# Patient Record
Sex: FEMALE | Employment: FULL TIME | URBAN - METROPOLITAN AREA
[De-identification: names, ages, dates, MRNs, and addresses within clinical notes are randomized per-mention and may not be internally consistent; named-entity substitution may affect disease eponyms.]

---

## 2017-01-18 ENCOUNTER — OFFICE VISIT (OUTPATIENT)
Dept: INTERNAL MEDICINE CLINIC | Age: 42
End: 2017-01-18

## 2017-01-18 VITALS
HEIGHT: 66 IN | BODY MASS INDEX: 25.55 KG/M2 | OXYGEN SATURATION: 96 % | TEMPERATURE: 97.6 F | HEART RATE: 70 BPM | WEIGHT: 159 LBS | DIASTOLIC BLOOD PRESSURE: 76 MMHG | SYSTOLIC BLOOD PRESSURE: 100 MMHG

## 2017-01-18 DIAGNOSIS — L98.9 LEG LESION: Primary | ICD-10-CM

## 2017-01-18 NOTE — MR AVS SNAPSHOT
Visit Information Date & Time Provider Department Dept. Phone Encounter #  
 1/18/2017 11:00 AM Rhys Boschma Internist of [facility] [id number] Your Appointments 8/29/2017  9:30 AM  
PHYSICAL with Deb Ojeda MD  
Internist of [address]) Appt Note: rpe sarris [address], [facility] [address] [place] [address] [place]  
  
   
 [address], [facility] hospital Upcoming Health Maintenance Date Due  
 PAP AKA CERVICAL CYTOLOGY 3/10/1997 INFLUENZA AGE 9 TO ADULT 8/1/2016 DTaP/Tdap/Td series (2 - Td) 6/1/2020 Allergies as of 1/18/2017  Review Complete On: 1/18/2017 By: Hair Gaitan LPN Severity Noted Reaction Type Reactions Other Medication  09/27/2011    Hives Strawberries Current Immunizations  Reviewed on 11/12/2015 Name Date Influenza Vaccine 12/4/2012 Influenza Vaccine (Quad) PF 11/12/2015 Influenza Vaccine PF 10/31/2014  4:18 PM, 10/14/2013 Influenza Vaccine Split 11/17/2011  3:27 PM  
 Influenza Vaccine Whole 6/30/2010 Tdap 6/1/2010 Not reviewed this visit Vitals BP Pulse Temp Height(growth percentile) Weight(growth percentile) LMP  
 100/76 70 97.6 °F (36.4 °C) (Oral) 5' 6\" (1.676 m) 159 lb (72.1 kg) 12/24/2016 SpO2 BMI OB Status Smoking Status 96% 25.66 kg/m2 Having regular periods Never Smoker Vitals History BMI and BSA Data Body Mass Index Body Surface Area  
 25.66 kg/m 2 1.83 m 2 Preferred Pharmacy Pharmacy Name Phone [phone] - [place], [address] AT [phone] Your Updated Medication List  
  
   
This list is accurate as of: 1/18/17 11:20 AM.  Always use your most recent med list.  
  
  
  
  
 levothyroxine 50 mcg tablet Commonly known as:  SYNTHROID Take 88 mcg by mouth Daily (before breakfast). Introducing [facility]! Houston Rosales introduces [employer] patient portal. Now you can access parts of your medical record, email your doctor's office, and request medication refills online. 1. In your internet browser, go to [email or web address] 2. Click on the First Time User? Click Here link in the Sign In box. You will see the New Member Sign Up page. 3. Enter your [employer] Access Code exactly as it appears below. You will not need to use this code after youve completed the sign-up process. If you do not sign up before the expiration date, you must request a new code. · [employer] Access Code: [email or web address] Expires: 4/18/2017 10:52 AM 
 
4. Enter the last four digits of your Social Security Number (xxxx) and Date of Birth (mm/dd/yyyy) as indicated and click Submit. You will be taken to the next sign-up page. 5. Create a [employer] ID. This will be your [employer] login ID and cannot be changed, so think of one that is secure and easy to remember. 6. Create a [employer] password. You can change your password at any time. 7. Enter your Password Reset Question and Answer. This can be used at a later time if you forget your password. 8. Enter your e-mail address. You will receive e-mail notification when new information is available in [address] 9. Click Sign Up. You can now view and download portions of your medical record. 10. Click the Download Summary menu link to download a portable copy of your medical information. If you have questions, please visit the Frequently Asked Questions section of the [employer] website. Remember, [employer] is NOT to be used for urgent needs. For medical emergencies, dial 911. Now available from your iPhone and Android! Please provide this summary of care documentation to your next provider. Your primary care clinician is listed as Vince Goldberg. If you have any questions after today's visit, please call [phone].

## 2017-01-18 NOTE — PROGRESS NOTES
HPI/History  Nessa Aguilar is a 37 y.o.  female who presents for evaluation. Pt reports noticing a small bump in left medial thigh about 2 months ago. Lesion has been asymptomatic and without any changes or developments. Although the lesion and area has not been painful or tender, she admits to some thigh soreness recently from squat exercises. There has been no other developments, sxs, or complaints. Pt reports her mother recently told her she and her sister have had lipomas at some point. Patient Active Problem List   Diagnosis Code    Family history of breast cancer Z80.3    Family history of thyroid disease Z83.49    Subclinical hypothyroidism E03.9     Past Medical History   Diagnosis Date    Family history of breast cancer     Family history of thyroid disease     Subclinical hypothyroidism      Past Surgical History   Procedure Laterality Date    Hx gyn       c section     Social History     Social History    Marital status:      Spouse name: N/A    Number of children: N/A    Years of education: N/A     Occupational History    Not on file. Social History Main Topics    Smoking status: Never Smoker    Smokeless tobacco: Never Used    Alcohol use Yes      Comment: infrequently    Drug use: Not on file    Sexual activity: Not on file     Other Topics Concern    Not on file     Social History Narrative     Family History   Problem Relation Age of Onset   [address] [facility] Breast Cancer Mother 48    Hypertension Father     Other Sister      identical twin; Grave's disease     Current Outpatient Prescriptions   Medication Sig    levothyroxine (SYNTHROID) 50 mcg tablet Take 88 mcg by mouth Daily (before breakfast). No current facility-administered medications for this visit. Allergies   Allergen Reactions    Other Medication Hives     Strawberries         Review of Systems  Significant findings included in HPI.     Physical Examination  Visit Vitals    /76    Pulse 70  Temp 97.6 °F (36.4 °C) (Oral)    Ht 5' 6\" (1.676 m)    Wt 159 lb (72.1 kg)    LMP 12/24/2016    SpO2 96%    BMI 25.66 kg/m2       General - Alert and in no acute distress. Pt appears well, comfortable, and in good spirits. Nontoxic. Not anxious, non-diaphoretic. Mental status - Appropriate mood, behavior, speech content, dress, and thought processes. Pulm - No tachypnea, retractions, or cyanosis. Good respiratory effort. Cardiovascular - Normal rate. LE - There is a very small, nonvisible but palpable lesion of the left anteromedial thigh several inches above the knee. No apparent skin lesion or signs of past trauma. Lesion more of a minimal subcutaneous area of induration than anything. Could just be induration (past trauma, other), small cyst, or miniscule lipoma. There are no concerning sxs or other findings. Assessment and Plan  1. Left thigh lesion - Asymptomatic and without changes, developments, or concerning sxs. Unknown etiology but likely benign and discussed various potentials. She will observe and return if becoming symptomatic or any changes/developments. Pt happily agrees with plan. PLEASE NOTE:   This document has been produced using voice recognition software. Unrecognized errors in transcription may be present.     Garret  [employer] [facility]  [phone]  1/18/2017

## 2017-02-06 RX ORDER — OSELTAMIVIR PHOSPHATE 75 MG/1
75 CAPSULE ORAL DAILY
Qty: 10 CAP | Refills: 0 | Status: SHIPPED | OUTPATIENT
Start: 2017-02-06 | End: 2017-02-16

## 2017-02-06 NOTE — TELEPHONE ENCOUNTER
Pt called and stated tht her son was dx with the flu last night and she is asking for a rx for tamiflu, pls send to [facility] on [address] [place]

## 2017-02-15 ENCOUNTER — TELEPHONE (OUTPATIENT)
Dept: INTERNAL MEDICINE CLINIC | Age: 42
End: 2017-02-15

## 2017-03-08 ENCOUNTER — TELEPHONE (OUTPATIENT)
Dept: INTERNAL MEDICINE CLINIC | Age: 42
End: 2017-03-08

## 2017-03-08 NOTE — TELEPHONE ENCOUNTER
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

## 2017-04-13 ENCOUNTER — HOSPITAL ENCOUNTER (OUTPATIENT)
Dept: MAMMOGRAPHY | Age: 42
Discharge: HOME | End: 2017-04-13
Attending: INTERNAL MEDICINE
Payer: COMMERCIAL

## 2017-04-13 ENCOUNTER — DOCUMENTATION ONLY (OUTPATIENT)
Dept: SURGERY | Age: 42
End: 2017-04-13

## 2017-04-13 DIAGNOSIS — Z12.31 VISIT FOR SCREENING MAMMOGRAM: ICD-10-CM

## 2017-04-13 PROCEDURE — 77067 SCR MAMMO BI INCL CAD: CPT

## 2017-08-23 ENCOUNTER — LAB ONLY (OUTPATIENT)
Dept: INTERNAL MEDICINE CLINIC | Age: 42
End: 2017-08-23

## 2017-08-23 DIAGNOSIS — Z83.49 FAMILY HISTORY OF THYROID DISEASE: ICD-10-CM

## 2017-08-23 DIAGNOSIS — Z00.00 ROUTINE GENERAL MEDICAL EXAMINATION AT A HEALTH CARE FACILITY: Primary | ICD-10-CM

## 2017-08-23 DIAGNOSIS — E03.8 SUBCLINICAL HYPOTHYROIDISM: ICD-10-CM

## 2017-08-29 ENCOUNTER — OFFICE VISIT (OUTPATIENT)
Dept: INTERNAL MEDICINE CLINIC | Age: 42
End: 2017-08-29

## 2017-08-29 ENCOUNTER — TELEPHONE (OUTPATIENT)
Dept: INTERNAL MEDICINE CLINIC | Age: 42
End: 2017-08-29

## 2017-08-29 VITALS
TEMPERATURE: 98.4 F | OXYGEN SATURATION: 100 % | BODY MASS INDEX: 25.07 KG/M2 | RESPIRATION RATE: 12 BRPM | HEART RATE: 70 BPM | WEIGHT: 156 LBS | DIASTOLIC BLOOD PRESSURE: 60 MMHG | SYSTOLIC BLOOD PRESSURE: 116 MMHG | HEIGHT: 66 IN

## 2017-08-29 DIAGNOSIS — Z00.01 ENCOUNTER FOR ROUTINE ADULT PHYSICAL EXAM WITH ABNORMAL FINDINGS: Primary | ICD-10-CM

## 2017-08-29 DIAGNOSIS — Z80.3 FAMILY HISTORY OF BREAST CANCER: ICD-10-CM

## 2017-08-29 DIAGNOSIS — E28.39 PREMATURE OVARIAN FAILURE: ICD-10-CM

## 2017-08-29 DIAGNOSIS — E27.1 ADRENAL INSUFFICIENCY (ADDISON'S DISEASE) (HCC): ICD-10-CM

## 2017-08-29 DIAGNOSIS — Z23 ENCOUNTER FOR IMMUNIZATION: ICD-10-CM

## 2017-08-29 DIAGNOSIS — E31.0: ICD-10-CM

## 2017-08-29 DIAGNOSIS — E03.9 PRIMARY HYPOTHYROIDISM: ICD-10-CM

## 2017-08-29 RX ORDER — NORETHINDRONE ACETATE AND ETHINYL ESTRADIOL .5; 2.5 MG/1; UG/1
1 TABLET ORAL DAILY
COMMUNITY

## 2017-08-29 RX ORDER — ACAR/CYST/ALA/Q10/P.SER/BROCC 800-300 MG
POWDER IN PACKET (EA) ORAL
COMMUNITY

## 2017-08-29 NOTE — PATIENT INSTRUCTIONS
Advance Directives: Care Instructions  Your Care Instructions  An advance directive is a legal way to state your wishes at the end of your life. It tells your family and your doctor what to do if you can no longer say what you want. There are two main types of advance directives. You can change them any time that your wishes change. · A living will tells your family and your doctor your wishes about life support and other treatment. · A durable power of  for health care lets you name a person to make treatment decisions for you when you can't speak for yourself. This person is called a health care agent. If you do not have an advance directive, decisions about your medical care may be made by a doctor or a  who doesn't know you. It may help to think of an advance directive as a gift to the people who care for you. If you have one, they won't have to make tough decisions by themselves. Follow-up care is a key part of your treatment and safety. Be sure to make and go to all appointments, and call your doctor if you are having problems. It's also a good idea to know your test results and keep a list of the medicines you take. How can you care for yourself at home? · Discuss your wishes with your loved ones and your doctor. This way, there are no surprises. · Many states have a unique form. Or you might use a universal form that has been approved by many states. This kind of form can sometimes be completed and stored online. Your electronic copy will then be available wherever you have a connection to the Internet. In most cases, doctors will respect your wishes even if you have a form from a different state. · You don't need a  to do an advance directive. But you may want to get legal advice. · Think about these questions when you prepare an advance directive:  ¨ Who do you want to make decisions about your medical care if you are not able to?  Many people choose a family member or close friend. ¨ Do you know enough about life support methods that might be used? If not, talk to your doctor so you understand. ¨ What are you most afraid of that might happen? You might be afraid of having pain, losing your independence, or being kept alive by machines. ¨ Where would you prefer to die? Choices include your home, a hospital, or a nursing home. ¨ Would you like to have information about hospice care to support you and your family? ¨ Do you want to donate organs when you die? ¨ Do you want certain Spiritism practices performed before you die? If so, put your wishes in the advance directive. · Read your advance directive every year, and make changes as needed. When should you call for help? Be sure to contact your doctor if you have any questions. Where can you learn more? Go to http://elizabethComat Technologieseben.info/. Enter R264 in the search box to learn more about \"Advance Directives: Care Instructions. \"  Current as of: November 17, 2016  Content Version: 11.3  © 2977-8196 [employer]. Care instructions adapted under license by [employer] (which disclaims liability or warranty for this information). If you have questions about a medical condition or this instruction, always ask your healthcare professional. [address] any warranty or liability for your use of this information. Learning About Healthy Weight  What is a healthy weight? A healthy weight is the weight at which you feel good about yourself and have energy for work and play. It's also one that lowers your risk for health problems. What can you do to stay at a healthy weight? It can be hard to stay at a healthy weight, especially when fast food, vending-machine snacks, and processed foods are so easy to find. And with your busy lifestyle, activity may be low on your list of things to do. But staying at a healthy weight may be easier than you think.   Here are some dos and don'ts for staying at a healthy weight:  Do eat healthy foods  The kinds of foods you eat have a big impact on both your weight and your health. Reaching and staying at a healthy weight is not about going on a diet. It's about making healthier food choices every day and changing your diet for good. Healthy eating means eating a variety of foods so that you get all the nutrients you need. Your body needs protein, carbohydrate, and fats for energy. They keep your heart beating, your brain active, and your muscles working. On most days, try to eat from each food group. This means eating a variety of:  · Whole grains, such as whole wheat breads and pastas. · Fruits and vegetables. · Dairy products, such as low-fat milk, yogurt, and cheese. · Lean proteins, such as all types of fish, chicken without the skin, and beans. Don't have too much or too little of one thing. All foods, if eaten in moderation, can be part of healthy eating. Even sweets can be okay. If your favorite foods are high in fat, salt, sugar, or calories, limit how often you eat them. Eat smaller servings, or look for healthy substitutes. Do watch what you eat  Many people eat more than their bodies need. Part of staying at a healthy weight means learning how much food you really need from day to day and not eating more than that. Even with healthy foods, eating too much can make you gain weight. Having a well-balanced diet means that you eat enough, but not too much, and that your food gives you the nutrients you need to stay healthy. So listen to your body. Eat when you're hungry. Stop when you feel satisfied. It's a good idea to have healthy snacks ready for when you get hungry. Keep healthy snacks with you at work, in your car, and at home. If you have a healthy snack easily available, you'll be less likely to pick a candy bar or bag of chips from a vending machine instead.   Some healthy snacks you might want to keep on hand are fruit, low-fat yogurt, string cheese, low-fat microwave popcorn, raisins and other dried fruit, nuts, whole wheat crackers, pretzels, carrots, celery sticks, and broccoli. Do some physical activity  A big part of reaching and staying at a healthy weight is being active. When you're active, you burn calories. This makes it easier to reach and stay at a healthy weight. When you're active on a regular basis, your body burns more calories, even when you're at rest. Being active helps you lose fat and build lean muscle. Try to be active for at least 1 hour every day. This may sound like a lot, but it's okay to be active in smaller blocks of time that add up to 1 hour a day. Any activity that makes your heart beat faster and keeps it there for a while counts. A brisk walk, run, or swim will get your heart beating faster. So will climbing stairs, shooting baskets, or cycling. Even some household chores like vacuuming and mowing the lawn will get your heart rate up. Pick activities that you enjoyones that make your heart beat faster, your muscles stronger, and your muscles and joints more flexible. If you find more than one thing you like doing, do them all. You don't have to do the same thing every day. Don't diet  Diets don't work. Diets are temporary. Because you give up so much when you diet, you may be hungry and think about food all the time. And after you stop dieting, you also may overeat to make up for what you missed. Most people who diet end up gaining back the pounds they lostand more. Remember that healthy bodies come in lots of shapes and sizes. Everyone can get healthier by eating better and being more active. Where can you learn more? Go to http://elizabeth-eben.info/. Enter 293 7930 in the search box to learn more about \"Learning About Healthy Weight. \"  Current as of: October 13, 2016  Content Version: 11.3  © 7048-6095 [employer].  Care instructions adapted under license by [address] (which disclaims liability or warranty for this information). If you have questions about a medical condition or this instruction, always ask your healthcare professional. [address] any warranty or liability for your use of this information.

## 2017-08-29 NOTE — TELEPHONE ENCOUNTER
Please request results of well woman exam and pap smear from Dr. Destiny Hudson, done on 5/12/2017. Thank you.

## 2017-08-29 NOTE — MR AVS SNAPSHOT
Visit Information Date & Time Provider Department Dept. Phone Encounter #  
 8/29/2017  9:30 AM Chiquis Zazueta MD Internist of [facility] [phone] [id number] Follow-up Instructions Return in about 1 year (around 8/29/2018), or if symptoms worsen or fail to improve. Your Appointments 8/23/2018  8:25 AM  
LAB with [facility] NURSE VISIT Internist of [facility] ([facility]) Appt Note: lab  
 [address], [address] [address] [address] [place]  
  
   
 [address], [address]  
  
    
 8/30/2018  8:00 AM  
PHYSICAL with Chiquis Zazueta MD  
Internist of [facility] Appt Note: rpe bs  
 [address] [facility], [facility] [address] [address] [place]  
  
   
 [address], [address] Upcoming Health Maintenance Date Due INFLUENZA AGE 9 TO ADULT 8/1/2017 PAP AKA CERVICAL CYTOLOGY 5/12/2020 DTaP/Tdap/Td series (2 - Td) 6/1/2020 Allergies as of 8/29/2017  Review Complete On: 8/29/2017 By: Elsa Terry Severity Noted Reaction Type Reactions Other Medication  09/27/2011    Hives Strawberries Current Immunizations  Reviewed on 11/12/2015 Name Date Influenza Vaccine 12/4/2012 Influenza Vaccine (Quad) PF  Incomplete, 11/12/2015 Influenza Vaccine PF 10/31/2014  4:18 PM, 10/14/2013 Influenza Vaccine Split 11/17/2011  3:27 PM  
 Influenza Vaccine Whole 6/30/2010 Tdap 6/1/2010 Not reviewed this visit You Were Diagnosed With   
  
 Codes Comments Encounter for routine adult physical exam with abnormal findings    -  Primary ICD-10-CM: Z00.01 
ICD-9-CM: V70.0 Encounter for immunization     ICD-10-CM: V08 ICD-9-CM: V03.89 Vitals BP Pulse Temp Resp Height(growth percentile) Weight(growth percentile)  116/60 (BP 1 Location: Right arm, BP Patient Position: Sitting) 70 98.4 °F (36.9 °C) (Oral) 12 5' 6\" (1.676 m) 156 lb (70.8 kg) LMP SpO2 BMI OB Status Smoking Status 03/15/2017 (Approximate) 100% 25.18 kg/m2 Unknown Never Smoker Vitals History BMI and BSA Data Body Mass Index Body Surface Area  
 25.18 kg/m 2 1.82 m 2 Preferred Pharmacy Pharmacy Name Phone [phone] [facility] [place] [address] AT [address] [phone] Your Updated Medication List  
  
   
This list is accurate as of: 8/29/17 10:22 AM.  Always use your most recent med list.  
  
  
  
  
 DHEA 25 mg Tab Generic drug:  prasterone (dhea) Take  by mouth.  
  
 levothyroxine 50 mcg tablet Commonly known as:  SYNTHROID Take 88 mcg by mouth Daily (before breakfast). norethindrone-ethinyl estradiol 0.5-2.5 mg-mcg per tablet Commonly known as:  FEMHRT LOW DOSE Take 1 Tab by mouth daily. We Performed the Following INFLUENZA VIRUS VAC QUAD,SPLIT,PRESV FREE SYRINGE 3/> YRS IM [id number] CPT(R)] Follow-up Instructions Return in about 1 year (around 8/29/2018), or if symptoms worsen or fail to improve. Patient Instructions Advance Directives: Care Instructions Your Care Instructions An advance directive is a legal way to state your wishes at the end of your life. It tells your family and your doctor what to do if you can no longer say what you want. There are two main types of advance directives. You can change them any time that your wishes change. · A living will tells your family and your doctor your wishes about life support and other treatment. · A durable power of  for health care lets you name a person to make treatment decisions for you when you can't speak for yourself. This person is called a health care agent. If you do not have an advance directive, decisions about your medical care may be made by a doctor or a  who doesn't know you. It may help to think of an advance directive as a gift to the people who care for you. If you have one, they won't have to make tough decisions by themselves. Follow-up care is a key part of your treatment and safety. Be sure to make and go to all appointments, and call your doctor if you are having problems. It's also a good idea to know your test results and keep a list of the medicines you take. How can you care for yourself at home? · Discuss your wishes with your loved ones and your doctor. This way, there are no surprises. · Many states have a unique form. Or you might use a universal form that has been approved by many states. This kind of form can sometimes be completed and stored online. Your electronic copy will then be available wherever you have a connection to the Internet. In most cases, doctors will respect your wishes even if you have a form from a different state. · You don't need a  to do an advance directive. But you may want to get legal advice. · Think about these questions when you prepare an advance directive: ¨ Who do you want to make decisions about your medical care if you are not able to? Many people choose a family member or close friend. ¨ Do you know enough about life support methods that might be used? If not, talk to your doctor so you understand. ¨ What are you most afraid of that might happen? You might be afraid of having pain, losing your independence, or being kept alive by machines. ¨ Where would you prefer to die? Choices include your home, a hospital, or a nursing home. ¨ Would you like to have information about hospice care to support you and your family? ¨ Do you want to donate organs when you die? ¨ Do you want certain Confucianism practices performed before you die? If so, put your wishes in the advance directive. · Read your advance directive every year, and make changes as needed. When should you call for help? Be sure to contact your doctor if you have any questions. Where can you learn more? Go to http://elizabeth-been.info/. Enter R264 in the search box to learn more about \"Advance Directives: Care Instructions. \" Current as of: November 17, 2016 Content Version: 11.3 © 3673-8044 "[employer]. Care instructions adapted under license by [employer] (which disclaims liability or warranty for this information). If you have questions about a medical condition or this instruction, always ask your healthcare professional. [address] any warranty or liability for your use of this information. Learning About Healthy Weight What is a healthy weight? A healthy weight is the weight at which you feel good about yourself and have energy for work and play. It's also one that lowers your risk for health problems. What can you do to stay at a healthy weight? It can be hard to stay at a healthy weight, especially when fast food, vending-machine snacks, and processed foods are so easy to find. And with your busy lifestyle, activity may be low on your list of things to do. But staying at a healthy weight may be easier than you think. Here are some dos and don'ts for staying at a healthy weight: 
Do eat healthy foods The kinds of foods you eat have a big impact on both your weight and your health. Reaching and staying at a healthy weight is not about going on a diet. It's about making healthier food choices every day and changing your diet for good. Healthy eating means eating a variety of foods so that you get all the nutrients you need. Your body needs protein, carbohydrate, and fats for energy. They keep your heart beating, your brain active, and your muscles working. On most days, try to eat from each food group. This means eating a variety of: · Whole grains, such as whole wheat breads and pastas. · Fruits and vegetables. · Dairy products, such as low-fat milk, yogurt, and cheese. · Lean proteins, such as all types of fish, chicken without the skin, and beans. Don't have too much or too little of one thing. All foods, if eaten in moderation, can be part of healthy eating. Even sweets can be okay. If your favorite foods are high in fat, salt, sugar, or calories, limit how often you eat them. Eat smaller servings, or look for healthy substitutes. Do watch what you eat Many people eat more than their bodies need. Part of staying at a healthy weight means learning how much food you really need from day to day and not eating more than that. Even with healthy foods, eating too much can make you gain weight. Having a well-balanced diet means that you eat enough, but not too much, and that your food gives you the nutrients you need to stay healthy. So listen to your body. Eat when you're hungry. Stop when you feel satisfied. It's a good idea to have healthy snacks ready for when you get hungry. Keep healthy snacks with you at work, in your car, and at home. If you have a healthy snack easily available, you'll be less likely to pick a candy bar or bag of chips from a vending machine instead. Some healthy snacks you might want to keep on hand are fruit, low-fat yogurt, string cheese, low-fat microwave popcorn, raisins and other dried fruit, nuts, whole wheat crackers, pretzels, carrots, celery sticks, and broccoli. Do some physical activity A big part of reaching and staying at a healthy weight is being active. When you're active, you burn calories. This makes it easier to reach and stay at a healthy weight. When you're active on a regular basis, your body burns more calories, even when you're at rest. Being active helps you lose fat and build lean muscle. Try to be active for at least 1 hour every day.  This may sound like a lot, but it's okay to be active in smaller blocks of time that add up to 1 hour a day. Any activity that makes your heart beat faster and keeps it there for a while counts. A brisk walk, run, or swim will get your heart beating faster. So will climbing stairs, shooting baskets, or cycling. Even some household chores like vacuuming and mowing the lawn will get your heart rate up. Pick activities that you enjoyones that make your heart beat faster, your muscles stronger, and your muscles and joints more flexible. If you find more than one thing you like doing, do them all. You don't have to do the same thing every day. Don't diet Diets don't work. Diets are temporary. Because you give up so much when you diet, you may be hungry and think about food all the time. And after you stop dieting, you also may overeat to make up for what you missed. Most people who diet end up gaining back the pounds they lostand more. Remember that healthy bodies come in lots of shapes and sizes. Everyone can get healthier by eating better and being more active. Where can you learn more? Go to http://elizabeth-eben.info/. Enter 952 2540 in the search box to learn more about \"Learning About Healthy Weight. \" Current as of: October 13, 2016 Content Version: 11.3 © 6563-0859 [employer]. Care instructions adapted under license by [employer] (which disclaims liability or warranty for this information). If you have questions about a medical condition or this instruction, always ask your healthcare professional. [place] [address] any warranty or liability for your use of this information. Introducing [facility]! Dear Maggi Pantoja: Thank you for requesting a [employer] account. Our records indicate that you already have an active [employer] account. You can access your account anytime at [email or web address] Did you know that you can access your hospital and ER discharge instructions at any time in [employer]? You can also review all of your test results from your hospital stay or ER visit. Additional Information If you have questions, please visit the Frequently Asked Questions section of the [employer] website at [email or web address] Remember, [employer] is NOT to be used for urgent needs. For medical emergencies, dial 911. Now available from your iPhone and Android! Please provide this summary of care documentation to your next provider. Your primary care clinician is listed as Bree Alejo. If you have any questions after today's visit, please call [phone].

## 2017-08-29 NOTE — TELEPHONE ENCOUNTER
At check out pt asked why labs arent on my chart. Not showing results. Please mail a copy to patient.

## 2017-08-29 NOTE — PROGRESS NOTES
1. Have you been to the ER, urgent care clinic or hospitalized since your last visit? NO.     2. Have you seen or consulted any other health care providers outside of the [facility] since your last visit (Include any pap smears or colon screening)? YES  Dr. Kate Colindres and Endocrinologist at [facility] in June 2017. Dermatologist Dr. Malka Wright in May 2017. Dr. Parag Madrid a Reproductive endocrinologist in Feb. 2017 by [facility]. Do you have an Advanced Directive? NO    Would you like information on Advanced Directives? YES    Health Maintenance Due   Topic Date Due    PAP AKA CERVICAL CYTOLOGY  03/10/1997    INFLUENZA AGE 9 TO ADULT  08/01/2017     Patient complians of left heel pain for 6-8 months. Patient states she had a PAP done May 12, 2017 at [address]. Patient given influenza vaccine Fluarix into left deltoid and tolerated well without adverse reactions on complications. Patient given vaccine information statement handout.

## 2017-08-30 NOTE — TELEPHONE ENCOUNTER
Her labs are processed by [facility] due to her insurance and those results don't interface into [facility] so we have to wait to get those results faxed in. They have been received and copy will go out in today's mail.

## 2017-09-01 ENCOUNTER — TELEPHONE (OUTPATIENT)
Dept: INTERNAL MEDICINE CLINIC | Age: 42
End: 2017-09-01

## 2017-09-01 PROBLEM — E28.39 PREMATURE OVARIAN FAILURE: Status: ACTIVE | Noted: 2017-01-01

## 2017-09-01 RX ORDER — LEVOTHYROXINE SODIUM 88 UG/1
88 TABLET ORAL
Qty: 30 TAB | Refills: 5
Start: 2017-09-01

## 2017-09-01 NOTE — PROGRESS NOTES
HPI:   Jeff Ross is a 40y.o. year old female who presents today for a physical exam. She has a history of premature ovarian failure, primary hypothyroidism, and was recently diagnosed with adrenal insufficiency. She has been diagnosed with autoimmune polyendocrine syndrome type II and is followed at [facility]. She reports that she is currently doing well. She does describe difficulty with left heel pain, particularly when she first gets up out of bed or a chair. She states that after she starts walking, the pain resolves. She denies any redness, swelling, or known trauma. She denies any pain in other joints. She is otherwise without complaints. She has a history of primary hypothyroidism, and is being treated with levothyroxine. She denies any cold intolerance, hair or skin changes, fatigue, or myalgias. She had a thyroid ultrasound in 4/2015, which showed heterogeneous thyroid echotexture, which could be the result of exogenous thyroid hormone. No discrete mass in either lobe was indentified. She was also diagnosed with premature ovarian failure and is currently taking hormone replacement. In 6/2017, she was evaluated for adrenal insufficiency since her twin sister was diagnosed with Naranjito's disease. She was found to have normal renin and cortisol levels at baseline, but a cortrosyn stimulation test was strongly positive for adrenal insufficiency. She was not started on therapy, but advised to have hydrocortisone available for times of illness. She has a strong family history of thyroid disease, with her twin sister having Grave's disease. Her mother and three maternal aunts have hypothyroidism, and five first cousins have Graves disease. She also has a family history of breast cancer, with her mother being diagnosed 23 years ago at the age of 48. She underwent lumpectomy, external beam radiation, and chemotherapy, and continue to take Femara.  She reports that her mother was tested and is BRCA 1 and 2 negative. She exercises regularly, doing Jazzercise three days per week. She denies any chest pain, shortness of breath at rest or with exertion, palpitations, lightheadedness, or edema. Past Medical History:   Diagnosis Date    Adrenal insufficiency ([place]'s disease) ([facility] 8/29/2017    Autoimmune polyendocrine syndrome type II ([facility] 8/29/2017    Family history of breast cancer     Family history of thyroid disease     Premature ovarian failure 2017    Primary hypothyroidism      Past Surgical History:   Procedure Laterality Date    HX GYN      c section     Current Outpatient Prescriptions   Medication Sig    levothyroxine (SYNTHROID) 88 mcg tablet Take 1 Tab by mouth Daily (before breakfast).  norethindrone-ethinyl estradiol (FEMHRT LOW DOSE) 0.5-2.5 mg-mcg per tablet Take 1 Tab by mouth daily.  prasterone, dhea, (DHEA) 25 mg tab Take  by mouth. No current facility-administered medications for this visit. Allergies and Intolerances: Allergies   Allergen Reactions    Other Medication Hives     Strawberries       Family History: as per HPI  Family History   Problem Relation Age of Onset    Breast Cancer Mother 48    Hypertension Father      Social History:   She  reports that she has never smoked. She has never used smokeless tobacco. She is  with 2 children, ages 6 and 11. She is employed as a  with the [employer]. History   Alcohol Use    Yes     Comment: infrequently     Immunization History:  Immunization History   Administered Date(s) Administered    Influenza Vaccine 12/04/2012    Influenza Vaccine (Quad) PF 11/12/2015, 08/29/2017    Influenza Vaccine PF 10/14/2013, 10/31/2014    Influenza Vaccine Split 11/17/2011    Influenza Vaccine Whole 06/30/2010    Tdap 06/01/2010       Review of Systems:   As above included in HPI.   Otherwise 11 point review of systems negative including constitutional, skin, HENT, eyes, respiratory, cardiovascular, gastrointestinal, genitourinary, musculoskeletal, endocrine, hematologic, allergy, and neurologic. Physical:   Vitals:   BP: 116/60  HR: 70  WT: 156 lb (70.8 kg)  BMI:  25.18 kg/m2    Exam:   Patient appears in no apparent distress. Affect is appropriate. HEENT --Anicteric sclerae, tympanic membranes normal,  ear canals normal.  PERRL, EOMI, conjunctiva and lids normal.   Sinuses were nontender, turbinates normal, hearing normal.  Oropharynx without  erythema, normal tongue, oral mucosa and tonsils. No cervical lymphadenopathy. No thyromegaly, JVD, or bruits. Carotid pulses 2+ with normal upstroke. Lungs --Clear to auscultation. No wheezing or rales. Heart --Regular rate and rhythm, no murmurs, rubs, gallops, or clicks. Chest wall --Nontender to palpation. PMI normal.  Abdomen -- Soft and nontender, no hepatosplenomegaly or masses. Extremities -- Without cyanosis, clubbing, edema. 2+ pulses equally and bilaterally. Left ankle and foot without erythema, tenderness, or swelling. Range of motion intact. Normal looking digits, ROM intact  Derm  no obvious abnormalities noted, no rash    Review of Data:  Labs:  No visits with results within 1 Month(s) from this visit.   Latest known visit with results is:    Hospital Outpatient Visit on 08/11/2016   Component Date Value Ref Range Status    WBC 08/11/2016 6.6  4.6 - 13.2 K/uL Final    RBC 08/11/2016 4.61  4.20 - 5.30 M/uL Final    HGB 08/11/2016 13.8  12.0 - 16.0 g/dL Final    HCT 08/11/2016 42.5  35.0 - 45.0 % Final    MCV 08/11/2016 92.2  74.0 - 97.0 FL Final    MCH 08/11/2016 29.9  24.0 - 34.0 PG Final    MCHC 08/11/2016 32.5  31.0 - 37.0 g/dL Final    RDW 08/11/2016 13.3  11.6 - 14.5 % Final    PLATELET 82/87/9504 006  135 - 420 K/uL Final    MPV 08/11/2016 10.3  9.2 - 11.8 FL Final    NEUTROPHILS 08/11/2016 62  40 - 73 % Final    LYMPHOCYTES 08/11/2016 28  21 - 52 % Final    MONOCYTES 08/11/2016 8  3 - 10 % Final    EOSINOPHILS 08/11/2016 2  0 - 5 % Final    BASOPHILS 08/11/2016 0  0 - 2 % Final    ABS. NEUTROPHILS 08/11/2016 4.1  1.8 - 8.0 K/UL Final    ABS. LYMPHOCYTES 08/11/2016 1.9  0.9 - 3.6 K/UL Final    ABS. MONOCYTES 08/11/2016 0.6  0.05 - 1.2 K/UL Final    ABS. EOSINOPHILS 08/11/2016 0.1  0.0 - 0.4 K/UL Final    ABS. BASOPHILS 08/11/2016 0.0  0.0 - 0.06 K/UL Final    DF 08/11/2016 AUTOMATED    Final    LIPID PROFILE 08/11/2016        Final    Cholesterol, total 08/11/2016 181  <200 MG/DL Final    Triglyceride 08/11/2016 132  <150 MG/DL Final    HDL Cholesterol 08/11/2016 67* 40 - 60 MG/DL Final    LDL, calculated 08/11/2016 87.6  0 - 100 MG/DL Final    VLDL, calculated 08/11/2016 26.4  MG/DL Final    CHOL/HDL Ratio 08/11/2016 2.7  0 - 5.0   Final    Sodium 08/11/2016 137  136 - 145 mmol/L Final    Potassium 08/11/2016 4.3  3.5 - 5.5 mmol/L Final    Chloride 08/11/2016 103  100 - 108 mmol/L Final    CO2 08/11/2016 27  21 - 32 mmol/L Final    Anion gap 08/11/2016 7  3.0 - 18 mmol/L Final    Glucose 08/11/2016 87  74 - 99 mg/dL Final    BUN 08/11/2016 17  7.0 - 18 MG/DL Final    Creatinine 08/11/2016 0.80  0.6 - 1.3 MG/DL Final    BUN/Creatinine ratio 08/11/2016 21* 12 - 20   Final    GFR est AA 08/11/2016 >60  >60 ml/min/1.73m2 Final    GFR est non-AA 08/11/2016 >60  >60 ml/min/1.73m2 Final    Calcium 08/11/2016 8.6  8.5 - 10.1 MG/DL Final    Bilirubin, total 08/11/2016 0.8  0.2 - 1.0 MG/DL Final    ALT (SGPT) 08/11/2016 16  13 - 56 U/L Final    AST (SGOT) 08/11/2016 12* 15 - 37 U/L Final    Alk.  phosphatase 08/11/2016 44* 45 - 117 U/L Final    Protein, total 08/11/2016 6.9  6.4 - 8.2 g/dL Final    Albumin 08/11/2016 3.8  3.4 - 5.0 g/dL Final    Globulin 08/11/2016 3.1  2.0 - 4.0 g/dL Final    A-G Ratio 08/11/2016 1.2  0.8 - 1.7   Final    TSH 08/11/2016 1.80  0.36 - 3.74 uIU/mL Final     8/23/2017 Total chol 177/TG 91/ HDL 64/ LDL 94    Na 140/ K 4.4/ CO2 25    Glucose 96    Creatinine 0.86/ eGFR 84    Ca 8.9/ tot prot 6.7/ albumin 1.7    Total bilirubin 0.6/ alk phos 40    ALT 9/AST 13    TSH 2.29    WBC 6.7    Hb 13.2/ Hct 41.4    Platelets 532    Calculated 10 year ASCVD risk score:  0.3 %    Health Maintenance:  Screening:    Mammogram: negative (dense breasts) 4/2017   PAP smear: well women exam with Dr. Dina Glaser, pap/HPV negative (5/2017)   Colorectal: N/A   Depression: none   DM (HbA1c/FPG): FPG 96 (8/2017)   Hepatitis C: N/A   Falls: none   DEXA: N/A   Glaucoma: regular eye exams with Dr. Aracelis Morris (last 7/2017)   Smoking: none   Vitamin D: unknown   Medicare Wellness: N/A    Impression:  Patient Active Problem List   Diagnosis Code    Family history of breast cancer Z80.3    Family history of thyroid disease Z83.49    Primary hypothyroidism E03.9    [facility] E31.0    Adrenal insufficiency ([place]'s disease) ([facility] E27.1    Premature ovarian failure E28.8       Plan:  1. Primary hypothyroidism. TSH with evidence of adequate replacement on current Synthroid dose. Continue to follow. 2. Adrenal insufficiency. Normal cortisol level at baseline, but strongly positive cortrosyn stimulation test. Instructed to take hydrocortisone (20 mg qAM/10 mg qPM) in times of illness. Otherwise, asymptomatic. Follow. 3. Premature ovarian failure. On hormone replacement therapy. Bone density study performed at [facility]. Will request results. 4. Family history of breast cancer. Patient with strong family history of breast cancer, with her mother developing it at the age of 48. She is currently being screened with mammogram yearly. Unclear of best approach at this age as recommendations for breast cancer prevention among high risk women are based upon assessment of their lifetime risk of breast cancer.  Referred for genetic counseling to assess her lifetime risk and determine if she meets criteria for risk reducing medication (tamoxifen or aromatase inhibitor) and if so, what would be the best timing for such. Current recommendations would be to obtain breast MRI alternating with mammograms and treat with a risk reducing medication for 5 years if calculated lifetime risk is > 20%. Patient states that was told that her insurance may not cover genetic testing and she was not contacted by genetic counselor. Given contact information again for Marisela [facility] at [facility]. 5. Left heal pain. No obvious abnormality. Pain transient when first stepping out of bed or from a chair, but resolves quickly. Discussed observing positioning of foot when sitting or sleeping, and discussed proper footwear with exercise. Discussed stretching and other conservative measures. Follow. 6. Health maintenance. Will give influenza vaccine today. Other immunizations up to date. Well women exams with Dr. Troy Lopez. Will request copy of pap smear results. Continue regular eye exams with Dr. Lesley Garrison. Patient understands recommendations and agrees with plan. Follow-up in 1 year.

## 2017-09-11 DIAGNOSIS — Z00.00 ROUTINE GENERAL MEDICAL EXAMINATION AT A HEALTH CARE FACILITY: ICD-10-CM

## 2017-09-11 DIAGNOSIS — Z83.49 FAMILY HISTORY OF THYROID DISEASE: ICD-10-CM

## 2017-09-11 DIAGNOSIS — E03.8 SUBCLINICAL HYPOTHYROIDISM: ICD-10-CM

## 2017-10-26 ENCOUNTER — OFFICE VISIT (OUTPATIENT)
Dept: INTERNAL MEDICINE CLINIC | Age: 42
End: 2017-10-26

## 2017-10-26 VITALS
OXYGEN SATURATION: 100 % | RESPIRATION RATE: 14 BRPM | BODY MASS INDEX: 25.71 KG/M2 | WEIGHT: 160 LBS | TEMPERATURE: 98.7 F | HEART RATE: 75 BPM | HEIGHT: 66 IN | DIASTOLIC BLOOD PRESSURE: 78 MMHG | SYSTOLIC BLOOD PRESSURE: 130 MMHG

## 2017-10-26 DIAGNOSIS — H69.83 ETD (EUSTACHIAN TUBE DYSFUNCTION), BILATERAL: Primary | ICD-10-CM

## 2017-10-26 NOTE — PROGRESS NOTES
HPI/History  Coleen Peralta is a 40 y.o.  female who presents for evaluation. Pt reports fluid sensation in both ears for about the last 4 days but slight discomfort in right. No otorrhea or other sxs. No other cold/flu/allergy or lower respiratory sxs. Denies any fevers, malaise, or other complaints. Reports [place] is followed by [facility] and has hydrocortisone tablets for prn use as per their instruction but is not on currently. 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    Adrenal insufficiency ([place]'s disease) ([facility] E27.1    Premature ovarian failure E28.8     Past Medical History:   Diagnosis Date    Adrenal insufficiency ([place]'s disease) ([facility] 8/29/2017    Autoimmune polyendocrine syndrome type II ([facility] 8/29/2017    Family history of breast cancer     Family history of thyroid disease     Premature ovarian failure 2017    Primary hypothyroidism      Past Surgical History:   Procedure Laterality Date    HX GYN      c section     Social History     Social History    Marital status:      Spouse name: N/A    Number of children: N/A    Years of education: N/A     Occupational History    Not on file. Social History Main Topics    Smoking status: Never Smoker    Smokeless tobacco: Never Used    Alcohol use Yes      Comment: infrequently    Drug use: No    Sexual activity: Yes     Other Topics Concern    Not on file     Social History Narrative     Family History   Problem Relation Age of Onset    Breast Cancer Mother 48    Hypertension Father      Current Outpatient Prescriptions   Medication Sig    levothyroxine (SYNTHROID) 88 mcg tablet Take 1 Tab by mouth Daily (before breakfast).     norethindrone-ethinyl estradiol (FEMHRT LOW DOSE) 0.5-2.5 mg-mcg per tablet Take 1 Tab by mouth daily.    prasterone, dhea, (DHEA) 25 mg tab Take  by mouth. No current facility-administered medications for this visit. Allergies   Allergen Reactions    Other Medication Hives     Strawberries         Review of Systems  Aside from those included in HPI, remainder of ROS negative. Physical Examination  Visit Vitals    /78 (BP 1 Location: Right arm, BP Patient Position: Sitting)    Pulse 75    Temp 98.7 °F (37.1 °C) (Oral)    Resp 14    Ht 5' 6\" (1.676 m)    Wt 160 lb (72.6 kg)    LMP 03/01/2017 (Approximate)    SpO2 100%    BMI 25.82 kg/m2       General - Alert and in no acute distress. Pt appears well, comfortable, and in good spirits. Pleasant, engaging. Nontoxic. Not anxious, non-diaphoretic. Mental status - Appropriate mood, speech content, dress, and thought processes. Eyes - Pupils equal and reactive, extraocular movements intact. No erythema or discharge. Ears - No discomfort with external manipulation. No mastoidal findings. Auditory canals appear normal.  TMs appear normal. No signs of infection or effusion. Hearing intact bilat. Nose - No erythema. No rhinorrhea. Pulm - No tachypnea, retractions, or cyanosis. Good respiratory effort. Cardiovascular - Normal rate. Assessment and Plan  1. Bilat ETD - Trial of decongestants and flonase. Avoid prolonged use of decongestants due to potential adverse effects. May consider steroid taper in future but hold on this for now. Discussed likely course and prognosis. Return/call if needed. Further planning as warranted. Pt happily agrees with plan. PLEASE NOTE:   This document has been produced using voice recognition software. Unrecognized errors in transcription may be present.     [employer]  [phone]  10/26/2017

## 2017-10-26 NOTE — PROGRESS NOTES
1. Have you been to the ER, urgent care clinic or hospitalized since your last visit? NO.     2. Have you seen or consulted any other health care providers outside of the [address] [place] [address] since your last visit (Include any pap smears or colon screening)? NO      Do you have an Advanced Directive? NO    Would you like information on Advanced Directives?  NO

## 2017-10-26 NOTE — MR AVS SNAPSHOT
Visit Information Date & Time Provider Department Dept. Phone Encounter #  
 10/26/2017  1:30 PM [facility] [address] [place] [id number] [phone] Your Appointments 8/23/2018  8:25 AM  
LAB with [facility] NURSE VISIT [facility] [address] [place] [address] ([facility]) Appt Note: lab  
 [address], [place] [id number] [address] [place]  
  
   
 [address], [facility]  
  
    
 8/30/2018  8:00 AM  
PHYSICAL with Sveta Eid MD  
[facility]) Appt Note: rpe bs  
 [address] [facility], [facility] [address] [address] [place]  
  
   
 [address], [facility] Upcoming Health Maintenance Date Due DTaP/Tdap/Td series (2 - Td) 6/1/2020 PAP AKA CERVICAL CYTOLOGY 5/12/2022 Allergies as of 10/26/2017  Review Complete On: 10/26/2017 By: Josie Sarmiento Severity Noted Reaction Type Reactions Other Medication  09/27/2011    Hives Strawberries Current Immunizations  Reviewed on 8/29/2017 Name Date Influenza Vaccine 12/4/2012 Influenza Vaccine (Quad) PF 8/29/2017 10:20 AM, 11/12/2015 Influenza Vaccine PF 10/31/2014  4:18 PM, 10/14/2013 Influenza Vaccine Split 11/17/2011  3:27 PM  
 Influenza Vaccine Whole 6/30/2010 Tdap 6/1/2010 Not reviewed this visit Vitals BP Pulse Temp Resp Height(growth percentile) Weight(growth percentile) 130/78 (BP 1 Location: Right arm, BP Patient Position: Sitting) 75 98.7 °F (37.1 °C) (Oral) 14 5' 6\" (1.676 m) 160 lb (72.6 kg) LMP SpO2 BMI OB Status Smoking Status 03/01/2017 (Approximate) 100% 25.82 kg/m2 Unknown Never Smoker Vitals History BMI and BSA Data Body Mass Index Body Surface Area  
 25.82 kg/m 2 1.84 m 2 Preferred Pharmacy Pharmacy Name Phone  [phone] - [facility] [address] [facility] [address] [facility] Your Updated Medication List  
  
   
This list is accurate as of: 10/26/17  1:45 PM.  Always use your most recent med list.  
  
  
  
  
 DHEA 25 mg Tab Generic drug:  prasterone (dhea) Take  by mouth.  
  
 levothyroxine 88 mcg tablet Commonly known as:  SYNTHROID Take 1 Tab by mouth Daily (before breakfast). norethindrone-ethinyl estradiol 0.5-2.5 mg-mcg per tablet Commonly known as:  FEMHRT LOW DOSE Take 1 Tab by mouth daily. Introducing [facility]! Dear Juan A Pozo: Thank you for requesting a Jalbum account. Our records indicate that you already have an active Jalbum account. You can access your account anytime at [email or web address] Did you know that you can access your hospital and ER discharge instructions at any time in Jalbum? You can also review all of your test results from your hospital stay or ER visit. Additional Information If you have questions, please visit the Frequently Asked Questions section of the Jalbum website at [email or web address] Remember, Jalbum is NOT to be used for urgent needs. For medical emergencies, dial 911. Now available from your iPhone and Android! Please provide this summary of care documentation to your next provider. Your primary care clinician is listed as Mary Gaitan. If you have any questions after today's visit, please call [phone].

## 2017-10-30 ENCOUNTER — TELEPHONE (OUTPATIENT)
Dept: INTERNAL MEDICINE CLINIC | Age: 42
End: 2017-10-30

## 2017-10-30 NOTE — TELEPHONE ENCOUNTER
Pt called, she has Morton's disease, she shared a drink with her daughter Saturday and now daughter has been diagnosed with strep throat. Pt having some muscle pain which she said is a precursor to sickness for her so wants to know if an antiobiotic could/should be called in just in case she starts getting really sick. Told her Dr Bosie Aase would be in-office tomorrow to review the message so she stated she may call her endocrinologist and see what he wants to do. Told her to call us back if he does prescribe something.

## 2017-11-01 NOTE — TELEPHONE ENCOUNTER
Patient returning call. Says she is not having any symptoms as of yet. Wants to know if Dr. José Stahl thinks she should take something to be sure she doesn't get sick? She has not gotten any med from any other doctor.

## 2017-11-01 NOTE — TELEPHONE ENCOUNTER
Please let her know that would not recommend prophylactic antibiotics. Would just watch closely for any sore throat or other symptoms and call if develop.

## 2017-11-03 NOTE — TELEPHONE ENCOUNTER
Patient notified that it is not recommended to treat her without any symptoms at this time and if she develops any symptoms to call the office patient verbalized an understainding

## 2018-03-21 ENCOUNTER — HOSPITAL ENCOUNTER (OUTPATIENT)
Dept: PHYSICAL THERAPY | Age: 43
Discharge: HOME | End: 2018-03-21
Payer: COMMERCIAL

## 2018-03-21 PROCEDURE — 97161 PT EVAL LOW COMPLEX 20 MIN: CPT

## 2018-03-21 PROCEDURE — 97110 THERAPEUTIC EXERCISES: CPT

## 2018-03-21 NOTE — PROGRESS NOTES
[address] [facility] [address] [facility], [facility]  Phone: [phone] Fax: [phone] / [address] [address] PHYSICAL THERAPY SERVICES  Patient Name: Gloria Sauceda : 1975   Medical   Diagnosis: Chronic foot pain, left [F33.972, G89.29] Treatment Diagnosis: L calcaneal stress fx, PF/ achilles tendonitis    Onset Date: 1.5 years ago      Referral Source: Brandie Stone MD Start of Care [facility] [place], operated by [facility]): 3/21/2018   Prior Hospitalization: See medical history Provider #: [id number]   Prior Level of Function: Functional I    Comorbidities: Thyroid and adrenal insufficiency    Medications: Verified on Patient Summary List   The Plan of Care and following information is based on the information from the initial evaluation.   ========================================================================  Assessment / key information:  Pt is a 44y.o. year old female who presents with co L heel/ foot pain starting insidiously 1.5 year ago. Patient attempted to self treat with progressive worsening. Patient was dx with stress fracture of calcaneous and was put in boot for 1 month and then referred for PT custom orthotic and treatment. Patient just received orthotic and has not started to wear them. Current deficits include: increased pain to 7/10 at worst, decrease ankle strength PF, IV and EV 4/5, poor intrinsic foot strength: roscoe of GT extension L SLS EC 5 sec with increased foot instability. Functional deficits include: walking around [employer], running, exercise/ jazzercise - high impact. Home exercise program initiated on initial evaluation to address these deficits.  Pt would benefit from PT to address these deficits for increased functional mobility and QOL.  ========================================================================  Eval Complexity: History: MEDIUM  Complexity : 1-2 comorbidities / personal factors will impact the outcome/ POC Exam:HIGH Complexity : 4+ Standardized tests and measures addressing body structure, function, activity limitation and / or participation in recreation  Presentation: LOW Complexity : Stable, uncomplicated  Clinical Decision Making:MEDIUM Complexity : FOTO score of 26-74Overall Complexity:LOW   Problem List: pain affecting function, decrease strength, impaired gait/ balance, decrease ADL/ functional abilitiies, decrease activity tolerance and other FOTO 73/100   Treatment Plan may include any combination of the following: Therapeutic exercise, Therapeutic activities, Neuromuscular re-education, Physical agent/modality, Gait/balance training, Manual therapy, Aquatic therapy, Patient education, Self Care training and Functional mobility training  Patient / Family readiness to learn indicated by: asking questions, trying to perform skills and interest  Persons(s) to be included in education: patient (P)  Barriers to Learning/Limitations: None  Measures taken:    Patient Goal (s): \"Regain ability to exercise without pain \"   Patient self reported health status: fair  Rehabilitation Potential: good   Short Term Goals: To be accomplished in  1  weeks:  1. Pt will be independent and compliant with HEP   Long Term Goals: To be accomplished in  8-12  treatments:  1. Patient will increase FOTO score to 81/100 for indications of increased functional mobility. 2.  Patient will demo x15 SL HR L for increased ankle stability with jumping exercise   3. Patient will demo GT extension with toe yoga x 10 reps for improved intrinsic strength for return to running    4.  Patient will run 5 min on TM with no to min pain for progression to PLOF   Frequency / Duration:   Patient to be seen  2-3  times per week for 8-12  treatments:  Patient / Caregiver education and instruction: self care, activity modification and exercises  G-Codes (GP): PASHA  Therapist Signature: Violet Broussard, PT Date: 3/21/2018 Certification Period: NA Time: 103pm   ========================================================================  I certify that the above Physical Therapy Services are being furnished while the patient is under my care. I agree with the treatment plan and certify that this therapy is necessary. Physician Signature:        Date:       Time:   Please sign and return to [facility] at . [address] or you may fax the signed copy to [phone]. Thank you.

## 2018-03-21 NOTE — PROGRESS NOTES
PTANKLE EVAL AND TREATMENT     Patient Name: Juli Garcia  Date:3/21/2018  : 1975  [x]  Patient  Verified  Payor: Richardine Goodpasture / Plan: Rashida Kruger PPO / Product Type: PPO /    In time:1206  Out time:1240  Total Treatment Time (min): 34  Visit #: 1 of 8-12    Treatment Area: Chronic foot pain, left [M79.672, G89.29]    SUBJECTIVE  Pain Level (0-10 scale): (C): 0  (B): 0 (W):   7  Any medication changes, allergies to medications, diagnosis change, or new procedure performed?: see summary sheet for update  Subjective functional status/changes:   [] No changes reported  Chief complaint: Patient reports with co L heel/ foot pain starting insidiously 1.5 year ago. Patient attempted to self treat with progressive worsening. Patient was dx with stress fracture of calcaneous and was put in boot for 1 month and then referred for PT custom orthotic and treatment. Patient just received orthotic and has not started to wear them.      Deficits: walking around [employer], running, exercise/ jazzercise - high impact    Physical Therapy Evaluation  - Foot and Ankle    Gait: WNL     ROM/Strength    AROM WNL   Strength : DF 5/5, IV AND EV 4/5, SL HR x 10     Flexibility:   Gastroc: WNL  Soleus: WNL  HS - 90/90 HS test WNL     Palpation: denies TTP     Optional Tests:  SLS  (L): EO/ EC 30/ 10 (R): EO/EC 30/ 5    Swelling: WNL    Other tests/ comments:  Yoga: GT flexion: good, extension poor - unable to elevate GT  , ABD: good     OBJECTIVE   Patient Education/ Therapeutic Exercise : [x] Discussed POT including PT expectation, established HEP with pictures and instruction, per fs    (minutes) : 10  Manual - NT     Modality (rationale): PD    Pain Level (0-10 scale) post treatment: 0    ASSESSMENT  [x]  See Plan of Care    PLAN  [x]  Upgrade activities as tolerated     [x] Other:_  POC 2-3 x 8-12    Longview Abdi, PT 3/21/2018     Justification for Eval Code Complexity:  Patient History : chronicity   Examination see exam Clinical Presentation: stable   Clinical Decision Making : FOTO : 73 /100

## 2018-03-26 ENCOUNTER — HOSPITAL ENCOUNTER (OUTPATIENT)
Dept: PHYSICAL THERAPY | Age: 43
Discharge: HOME | End: 2018-03-26
Payer: COMMERCIAL

## 2018-03-26 PROCEDURE — 97116 GAIT TRAINING THERAPY: CPT

## 2018-03-26 PROCEDURE — 97140 MANUAL THERAPY 1/> REGIONS: CPT

## 2018-03-26 PROCEDURE — 97110 THERAPEUTIC EXERCISES: CPT

## 2018-03-26 NOTE — PROGRESS NOTES
PT DAILY TREATMENT NOTE     Patient Name: Radha Every  Date:3/26/2018  : 1975  [x]  Patient  Verified  Payor: Dasha Barr / Plan: Aamir Lipezio PPO / Product Type: PPO /    In time: 4:30 pm        Out time: 5:25 pm  Total Treatment Time (min): 55  Visit #: 2 of     Treatment Area: Chronic foot pain, left [M79.672, G89.29]    SUBJECTIVE  Pain Level (0-10 scale): 0-1  Any medication changes, allergies to medications, adverse drug reactions, diagnosis change, or new procedure performed?: [x] No    [] Yes (see summary sheet for update)  Subjective functional status/changes:   [] No changes reported  \"It doesn't hurt now, but I have been doing nothing but sitting all day. I have been doing the exercises, but not well. \"    OBJECTIVE  Modality rationale: PD   Min Type Additional Details    [] Estim: []Att   []Unatt        []TENS instruct                  []IFC  []Premod   []NMES                     []Other:  []w/US   []w/ice   []w/heat  Position:  Location:    []  Ice     []  heat  []  Ice massage Position:  Location:    []  Vasopneumatic Device Pressure:       [] lo [] med [] hi   Temperature: [] lo [] med [] hi   [] Skin assessment post-treatment:  []intact []redness- no adverse reaction       []redness  adverse reaction:     36 min Therapeutic Exercise:  [x] See flow sheet: initiated therex per IE   Rationale: increase ROM, increase strength, improve coordination, improve balance and increase proprioception to improve the patients ability to > tolerance to prolonged amb    11 min Manual Therapy:  prone DTM to (L) medial>lateral gastroc and soleus   Rationale: decrease pain, increase ROM, increase tissue extensibility and decrease trigger points to improve the patient's ability to amb with a normalized gait pattern    8 min Gait Training: 2 x 35 feet req S and VCs for inc GT flexion at terminal stance, cognizant ankle DF for proper HS   Rationale: improve gait quality and pattern to improve patient's ability to amb with a normalized gait pattern          X min Patient Education: [x] Review HEP - add ankle 4-way (GTB provided); use of orthotics with all amb     Other Objective/Functional Measures:   Patient has obtained orthotics, but denies use; encouraged gradual use to inc comfort with amb for possible return to running once ankle/foot strength has improved. Pain Level (0-10 scale) post treatment: 2-3    ASSESSMENT/Changes in Function:   Patient demos poor intrinsic foot control with WB, therefore, modified to NWB with LE elevated at EOB with improved control. Good GSC strength noted with toe walking with full HR, however, pt may be substituting with ankle strategy sec lack of intrinsic foot strength as pt notes lack of GT flexion during terminal stance. Patient will continue to benefit from skilled PT services to modify and progress therapeutic interventions, address functional mobility deficits, address ROM deficits, address strength deficits, analyze and address soft tissue restrictions, analyze and cue movement patterns, analyze and modify body mechanics/ergonomics, assess and modify postural abnormalities and address imbalance/dizziness to attain remaining goals. [x]  See Plan of Care  []  See progress note/recertification  []  See Discharge Summary         Progress towards goals / Updated goals:  Short Term Goals: To be accomplished in  1  weeks:  1. Pt will be independent and compliant with HEP. -Goal met; pt notes compliance (3/26/18)  Long Term Goals: To be accomplished in  8-12  treatments:  1. Patient will increase FOTO score to 81/100 for indications of increased functional mobility. 2.  Patient will demo x15 SL HR L for increased ankle stability with jumping exercise   3.   Patient will demo GT extension with toe yoga x 10 reps for improved intrinsic strength for return to running . -Goal progressing; able to perform GT extension with toe yoga, but NWB at EOB, unable in sitting (3/26/18)  4. Patient will run 5 min on TM with no to min pain for progression to PLOF.  -Goal progressing; will be assessed after 3 weeks of PT (3/26/18)     PLAN  [x]  Upgrade activities as tolerated     [x]  Continue plan of care  []  Update interventions per flow sheet       []  Discharge due to:_  [x]  Other: assess response to tx      Zac Perez, PTA 3/26/2018

## 2018-03-30 ENCOUNTER — HOSPITAL ENCOUNTER (OUTPATIENT)
Dept: PHYSICAL THERAPY | Age: 43
Discharge: HOME | End: 2018-03-30
Payer: COMMERCIAL

## 2018-03-30 PROCEDURE — 97140 MANUAL THERAPY 1/> REGIONS: CPT

## 2018-03-30 PROCEDURE — 97110 THERAPEUTIC EXERCISES: CPT

## 2018-03-30 NOTE — PROGRESS NOTES
PT DAILY TREATMENT NOTE     Patient Name: Felipa Ospina  Date:3/30/2018  : 1975  [x]  Patient  Verified  Payor: Jarrod Davis / Plan: Elio Saxena PPO / Product Type: PPO /    In time: 8:00 am        Out time: 8:45 am  Total Treatment Time (min): 45  Visit #: 3 of     Treatment Area: Chronic foot pain, left [M79.672, G89.29]    SUBJECTIVE  Pain Level (0-10 scale): 0-1   Any medication changes, allergies to medications, adverse drug reactions, diagnosis change, or new procedure performed?: [x] No    [] Yes (see summary sheet for update)  Subjective functional status/changes:   [] No changes reported  No trouble walking on uneven surfaces, in grocery stores, up/down stairs. Mild discomfort after last time, but it went away with time. One of the toe yogas makes my foot cramp.     OBJECTIVE  Modality rationale: PD   Min Type Additional Details    [] Estim: []Att   []Unatt        []TENS instruct                  []IFC  []Premod   []NMES                     []Other:  []w/US   []w/ice   []w/heat  Position:  Location:    []  Ice     []  heat  []  Ice massage Position:  Location:    []  Vasopneumatic Device Pressure:       [] lo [] med [] hi   Temperature: [] lo [] med [] hi   [] Skin assessment post-treatment:  []intact []redness- no adverse reaction       []redness  adverse reaction:     35 min Therapeutic Exercise:  [x] See flow sheet: added eccentric heel raises in // bars   Rationale: increase ROM, increase strength, improve coordination, improve balance and increase proprioception to improve the patients ability to > tolerance to prolonged amb    10 min Manual Therapy:  prone DTM to (L) medial>lateral gastroc and soleus   Rationale: decrease pain, increase ROM, increase tissue extensibility and decrease trigger points to improve the patient's ability to amb with a normalized gait pattern    0 min Gait Training: NT   Rationale: improve gait quality and pattern to improve patient's ability to amb with a normalized gait pattern          X min Patient Education: [x] Review HEP     Other Objective/Functional Measures:    SLS EC: 18 seconds    Pain Level (0-10 scale) post treatment: 0; ankle soreness    ASSESSMENT/Changes in Function:   Patient demos normalized gait pattern with use of orthotic shoe support. Quick onset of cramping with toe yoga with reduced GT extension in seated position with partial WB; pt would benefit from cont'd PT to improve intrinsic foot strength for return to running. PD ice due to family; encouraged icing at home. Patient will continue to benefit from skilled PT services to modify and progress therapeutic interventions, address functional mobility deficits, address ROM deficits, address strength deficits, analyze and address soft tissue restrictions, analyze and cue movement patterns, analyze and modify body mechanics/ergonomics, assess and modify postural abnormalities and address imbalance/dizziness to attain remaining goals. [x]  See Plan of Care  []  See progress note/recertification  []  See Discharge Summary         Progress towards goals / Updated goals:  Short Term Goals: To be accomplished in  1  weeks:  1. Pt will be independent and compliant with HEP. -Goal met; pt notes compliance (3/26/18)  Long Term Goals: To be accomplished in  8-12  treatments:  1. Patient will increase FOTO score to 81/100 for indications of increased functional mobility. 2.  Patient will demo x15 SL HR L for increased ankle stability with jumping exercise. -Goal progressing; DL HR 20 reps (3/30/18)  3. Patient will demo GT extension with toe yoga x 10 reps for improved intrinsic strength for return to running . -Goal progressing; able to perform GT extension with toe yoga, but NWB at EOB, unable in sitting (3/26/18)  4. Patient will run 5 min on TM with no to min pain for progression to PLOF.  -Goal progressing; will be assessed after 3 weeks of PT (3/26/18)     PLAN  [x]  Upgrade activities as tolerated     [x]  Continue plan of care  []  Update interventions per flow sheet       []  Discharge due to:_  [x]  Other: assess response to tx      Unknown HITESH Granger 3/30/2018

## 2018-04-02 ENCOUNTER — HOSPITAL ENCOUNTER (OUTPATIENT)
Dept: PHYSICAL THERAPY | Age: 43
Discharge: HOME | End: 2018-04-02
Payer: COMMERCIAL

## 2018-04-02 PROCEDURE — 97110 THERAPEUTIC EXERCISES: CPT

## 2018-04-02 PROCEDURE — 97140 MANUAL THERAPY 1/> REGIONS: CPT

## 2018-04-02 NOTE — PROGRESS NOTES
PT DAILY TREATMENT NOTE     Patient Name: Shahab Hawley  Date:2018  : 1975  [x]  Patient  Verified  Payor: Debria Gaucher / Plan: Kayla Chavez PPO / Product Type: PPO /    In time: 4:51 pm        Out time: 5:42 pm  Total Treatment Time (min): 51  Visit #: 4 of     Treatment Area: Chronic foot pain, left [M79.672, G89.29]    SUBJECTIVE  Pain Level (0-10 scale): 0  Any medication changes, allergies to medications, adverse drug reactions, diagnosis change, or new procedure performed?: [x] No    [] Yes (see summary sheet for update)  Subjective functional status/changes:   [] No changes reported  \"I don't really have pain with daily activities, most pain is 2/10 and that's with just walking. I can walk barefoot to the shower without pain. \"    OBJECTIVE  Modality rationale: PD   Min Type Additional Details    [] Estim: []Att   []Unatt        []TENS instruct                  []IFC  []Premod   []NMES                     []Other:  []w/US   []w/ice   []w/heat  Position:  Location:    []  Ice     []  heat  []  Ice massage Position:  Location:    []  Vasopneumatic Device Pressure:       [] lo [] med [] hi   Temperature: [] lo [] med [] hi   [] Skin assessment post-treatment:  []intact []redness- no adverse reaction       [x]redness  adverse reaction:     41 min Therapeutic Exercise:  [x] See flow sheet: added SL HR/TR; rockerboard taps (F/B and lateral)   Rationale: increase ROM, increase strength, improve coordination, improve balance and increase proprioception to improve the patients ability to > tolerance to prolonged amb    10 min Manual Therapy:  prone DTM to (L) medial>lateral gastroc and soleus f/b PF stretching, GT extension stretch   Rationale: decrease pain, increase ROM, increase tissue extensibility and decrease trigger points to improve the patient's ability to amb with a normalized gait pattern          X min Patient Education: [x] Review HEP - add SL HR (photo declined)     Other Objective/Functional Measures:    SLS EC: 21 seconds (5 seconds at IE)    Pain Level (0-10 scale) post treatment: 0    ASSESSMENT/Changes in Function:   Significant improvement in intrinsic foot strength. Patient demo's decreased stability with SL HR. Patient will continue to benefit from skilled PT services to modify and progress therapeutic interventions, address functional mobility deficits, address ROM deficits, address strength deficits, analyze and address soft tissue restrictions, analyze and cue movement patterns, analyze and modify body mechanics/ergonomics, assess and modify postural abnormalities and address imbalance/dizziness to attain remaining goals. [x]  See Plan of Care  []  See progress note/recertification   []  See Discharge Summary         Progress towards goals / Updated goals:  Short Term Goals: To be accomplished in  1  weeks:  1. Pt will be independent and compliant with HEP. -Goal met; pt notes compliance (3/26/18)  Long Term Goals: To be accomplished in  8-12  treatments:  1. Patient will increase FOTO score to 81/100 for indications of increased functional mobility. 2.  Patient will demo x15 SL HR L for increased ankle stability with jumping exercise. -Goal progressing; SL HR 10 reps with inc hip sway (4/2/18)  3. Patient will demo GT extension with toe yoga x 10 reps for improved intrinsic strength for return to running . -Goal progressing; pt able to perform GT extension 10 reps with toe yoga in standing, but with 50% ROM (4/2/18)  4. Patient will run 5 min on TM with no to min pain for progression to PLOF.  -Goal progressing; will be assessed after 3 weeks of PT (3/26/18)     PLAN  [x]  Upgrade activities as tolerated     [x]  Continue plan of care  []  Update interventions per flow sheet       []  Discharge due to:_  [x]  Other: plan to initiate running analysis next week    Aman Robert, PTA 4/2/2018

## 2018-04-04 ENCOUNTER — HOSPITAL ENCOUNTER (OUTPATIENT)
Dept: PHYSICAL THERAPY | Age: 43
Discharge: HOME | End: 2018-04-04
Payer: COMMERCIAL

## 2018-04-04 PROCEDURE — 97110 THERAPEUTIC EXERCISES: CPT

## 2018-04-04 PROCEDURE — 97140 MANUAL THERAPY 1/> REGIONS: CPT

## 2018-04-04 NOTE — PROGRESS NOTES
PT DAILY TREATMENT NOTE     Patient Name: Tahir Diehl  Date:2018  : 1975  [x]  Patient  Verified  Payor: Flex Gallegos / Plan: Henri Jones PPO / Product Type: PPO /    In time: 4:30 pm        Out time: 5:14 pm  Total Treatment Time (min): 44  Visit #: 5 of     Treatment Area: Chronic foot pain, left [M79.672, G89.29]    SUBJECTIVE  Pain Level (0-10 scale): 1  Any medication changes, allergies to medications, adverse drug reactions, diagnosis change, or new procedure performed?: [x] No    [] Yes (see summary sheet for update)  Subjective functional status/changes:   [] No changes reported  \"Just tightness today. \"    OBJECTIVE  Modality rationale: PD   Min Type Additional Details    [] Estim: []Att   []Unatt        []TENS instruct                  []IFC  []Premod   []NMES                     []Other:  []w/US   []w/ice   []w/heat  Position:  Location:    []  Ice     []  heat  []  Ice massage Position:  Location:    []  Vasopneumatic Device Pressure:       [] lo [] med [] hi   Temperature: [] lo [] med [] hi   [] Skin assessment post-treatment:  []intact []redness- no adverse reaction       [x]redness  adverse reaction:     34 min Therapeutic Exercise:  [x] See flow sheet: initiated pre-running drills (wall hops/taps), plyos static hops (F/B, lateral, static 10 x each)   Rationale: increase ROM, increase strength, improve coordination, improve balance and increase proprioception to improve the patients ability to > tolerance to prolonged amb    10 min Manual Therapy:  prone PNF CR calf stretching, 1st/2nd/3rd ray mobs; GT extension stretch with DTM to the medial plantar surface of the foot (flexor hallucis brevis)   Rationale: decrease pain, increase ROM, increase tissue extensibility and decrease trigger points to improve the patient's ability to amb with a normalized gait pattern          X min Patient Education: [x] Review HEP     Other Objective/Functional Measures:   SL HR: 20 reps    Pain Level (0-10 scale) post treatment: 0    ASSESSMENT/Changes in Function:   Good tolerance to plyometrics today with no c/o pain. Patient cont'ing to utilize custom orthotic with amb, but notes not walking barefoot yet. Slowly resolving tissue tension in the medial GSC, however, significant tightness noted in the FHB. Patient will continue to benefit from skilled PT services to modify and progress therapeutic interventions, address functional mobility deficits, address ROM deficits, address strength deficits, analyze and address soft tissue restrictions, analyze and cue movement patterns, analyze and modify body mechanics/ergonomics, assess and modify postural abnormalities and address imbalance/dizziness to attain remaining goals. [x]  See Plan of Care  []  See progress note/recertification   []  See Discharge Summary         Progress towards goals / Updated goals:  Short Term Goals: To be accomplished in  1  weeks:  1. Pt will be independent and compliant with HEP. -Goal met; pt notes compliance (3/26/18)  Long Term Goals: To be accomplished in  8-12  treatments:  1. Patient will increase FOTO score to 81/100 for indications of increased functional mobility. 2.  Patient will demo x15 SL HR L for increased ankle stability with jumping exercise. -Goal progressing; SL HR 20 reps with inc hip sway (4/4/18)  3. Patient will demo GT extension with toe yoga x 10 reps for improved intrinsic strength for return to running . -Goal progressing; pt able to perform GT extension 10 reps with toe yoga in standing, but with 50% ROM (4/2/18)  4. Patient will run 5 min on TM with no to min pain for progression to PLOF.  -Goal progressing; will be assessed after 3 weeks of PT (3/26/18)     PLAN  [x]  Upgrade activities as tolerated     [x]  Continue plan of care  []  Update interventions per flow sheet       []  Discharge due to:_  [x]  Other: provide GT extension and flexion self-PROM PAULINA Quiroga, PTA 4/4/2018

## 2018-04-09 ENCOUNTER — HOSPITAL ENCOUNTER (OUTPATIENT)
Dept: PHYSICAL THERAPY | Age: 43
Discharge: HOME | End: 2018-04-09
Payer: COMMERCIAL

## 2018-04-09 PROCEDURE — 97110 THERAPEUTIC EXERCISES: CPT

## 2018-04-09 PROCEDURE — 97140 MANUAL THERAPY 1/> REGIONS: CPT

## 2018-04-09 NOTE — PROGRESS NOTES
PT DAILY TREATMENT NOTE     Patient Name: Selvin Welch  Date:2018  : 1975  [x]  Patient  Verified  Payor: Fredrick Jane / Plan: Nely Denton PPO / Product Type: PPO /    In time: 4:25 pm        Out time: 5:21 pm  Total Treatment Time (min): 56  Visit #: 6 of     Treatment Area: Chronic foot pain, left [M79.672, G89.29]    SUBJECTIVE  Pain Level (0-10 scale): 1  Any medication changes, allergies to medications, adverse drug reactions, diagnosis change, or new procedure performed?: [x] No    [] Yes (see summary sheet for update)  Subjective functional status/changes:   [] No changes reported  \"I am not sure if I would call it pain, but I will give it a 1. I see the doctor on Wednesday. \"    OBJECTIVE  Modality rationale: PD   Min Type Additional Details    [] Estim: []Att   []Unatt        []TENS instruct                  []IFC  []Premod   []NMES                     []Other:  []w/US   []w/ice   []w/heat  Position:  Location:    []  Ice     []  heat  []  Ice massage Position:  Location:    []  Vasopneumatic Device Pressure:       [] lo [] med [] hi   Temperature: [] lo [] med [] hi   [] Skin assessment post-treatment:  []intact []redness- no adverse reaction       [x]redness  adverse reaction:     46 min Therapeutic Exercise:  [x] See flow sheet: initiated jogging on TM 3 minutes; reassessment   Rationale: increase ROM, increase strength, improve coordination, improve balance and increase proprioception to improve the patients ability to > tolerance to prolonged amb    10 min Manual Therapy:  prone PNF CR calf stretching, 1st/2nd/3rd ray mobs; GT extension stretch with DTM to the medial plantar surface of the foot (flexor hallucis brevis)   Rationale: decrease pain, increase ROM, increase tissue extensibility and decrease trigger points to improve the patient's ability to amb with a normalized gait pattern          X min Patient Education: [x] Review HEP; encouraged use of pillow for SLS EO at home Other Objective/Functional Measures:   Subjective improvement of 50% in symptoms since IE reported. Improvements: normalized gait pattern, 0-1 average pain, intrinsic foot control, stairs, prolonged ambulation  Deficits: high impact activities (running/jogging)  FOTO score: 73/100 (at last assessment, 73/100)  Ankle/foot AROM: WNL  Ankle/foot strength: PF 4+/5, DF 5/5, IV 4+/5, EV 4/5  SLS EC: 21 seconds (5 seconds at IE)  Pain: (A) 0-1    Goals reassessed for MD f/u; scheduled for 3 additional sessions and agreeable to cont 1x4 if not ready for D/C. Pain Level (0-10 scale) post treatment: 1    ASSESSMENT/Changes in Function:   See PN. Patient will continue to benefit from skilled PT services to modify and progress therapeutic interventions, address functional mobility deficits, address ROM deficits, address strength deficits, analyze and address soft tissue restrictions, analyze and cue movement patterns, analyze and modify body mechanics/ergonomics, assess and modify postural abnormalities and address imbalance/dizziness to attain remaining goals. []  See Plan of Care  [x]  See progress note/recertification (9/9/10)   []  See Discharge Summary         Progress towards goals / Updated goals:  Short Term Goals: To be accomplished in  1  weeks:  1. Pt will be independent and compliant with HEP. -Goal met; pt notes compliance (3/26/18)  Long Term Goals: To be accomplished in  8-12  treatments:  1. Patient will increase FOTO score to 81/100 for indications of increased functional mobility.  -Goal progressing; no change from IE at 73/100  2. Patient will demo x15 SL HR L for increased ankle stability with jumping exercise. -Goal met; SL HR 20 reps  3. Patient will demo GT extension with toe yoga x 10 reps for improved intrinsic strength for return to running . -Goal progressing; pt able to perform GT extension 10 reps with toe yoga in standing but notes intermittent cramping  4.   Patient will run 5 min on TM with no to min pain for progression to PLOF.  -Goal progressing; initiated running analysis today with pt able to tolerate 3 minutes with min pain    PLAN  [x]  Upgrade activities as tolerated     [x]  Continue plan of care  []  Update interventions per flow sheet       []  Discharge due to:_  [x]  Other: provide GT extension and flexion self-PROM NV; see PN, cont for remaining scheduled sessions, then 1x4 if needed    Mimi Suarez, PTA 4/9/2018

## 2018-04-10 NOTE — PROGRESS NOTES
[address] [facility] [address] [facility] AT [address]. [address] [address]  Phone: [phone] Fax: [phone]  PROGRESS NOTE  Patient Name: Yolande Vieira : 1975   Treatment/Medical Diagnosis: Chronic foot pain, left [V74.752, G89.29]   Referral Source: Fide Ramesh MD     Date of Initial Visit: 3/21/18 Attended Visits: 6 Missed Visits: 0     SUMMARY OF TREATMENT  Pt is a 44y.o. year old female who presents with co L heel/ foot pain starting insidiously 1.5 year ago. Treatment has consisted of the following: Therapeutic exercise, Therapeutic activities, Gait/balance training, Manual therapy, Patient education, Self Care training and Functional mobility training. CURRENT STATUS  Patient has made good, steady progress in PT as indicated by improved/normalized gait quality, reduced avg pain levels, improved ankle/foot strength, and inc ambulation tolerance (unlimited). Patient has progressed to TM jogging in clinic with min pain. Subjective improvement of 50% in symptoms since IE reported. Objective assessment as follows:  Deficits: high impact activities (running/jogging)  FOTO score: 73/100 (at last assessment, 73/100)  Ankle/foot AROM: WNL  Ankle/foot strength: PF 4+/5, DF 5/5, IV 4+/5, EV 4/5  SLS EC: 21 seconds (5 seconds at IE)  SLS EC foam: 12 seconds  Pain: (A) 0-1    Goal/Measure of Progress   1.  Patient will increase FOTO score to 81/100 for indications of increased functional mobility.  -Goal progressing; no change from IE at 73/100  2.  Patient will demo x15 SL HR L for increased ankle stability with jumping exercise. -Goal met; SL HR 20 reps  3. Patient will demo GT extension with toe yoga x 10 reps for improved intrinsic strength for return to running . -Goal progressing; pt able to perform GT extension 10 reps with toe yoga in standing but notes intermittent cramping  4.   Patient will run 5 min on TM with no to min pain for progression to PLOF. -Goal progressing; initiated running analysis today with pt able to tolerate 3 minutes with min pain    New Goals to be achieved in __3-7__  treatments:  1. Patient will be (I) with finalized HEP in preparation for D/C to self-management of sx. 2.  Patient will increase FOTO score to 81/100 for indications of increased functional mobility. 3.  Patient will demo GT extension with toe yoga x 10 reps without fatigue/cramping for improved intrinsic strength for return to running. 4. Patient will run 5 min on TM with no to min pain for progression to PLOF. RECOMMENDATIONS  Recommend cont for remaining 3 scheduled appts per original POC, then 1x4 (if needed) for 3-7 treatments to address deficits, achieve aforementioned goals, and progress pt towards PLOF. If you have any questions/comments please contact us directly at [phone]. Thank you for allowing us to assist in the care of your patient. LPTA Signature: Dina Edward [place]  Date: 4/9/2018   PT Signature: Tricia Matias [facility] Time: 8:41 PM   NOTE TO PHYSICIAN:  PLEASE COMPLETE THE ORDERS BELOW AND FAX TO   [facility]: [phone]. If you are unable to process this request in 24 hours please contact our office: [phone].  ___ I have read the above report and request that my patient continue as recommended.   ___ I have read the above report and request that my patient continue therapy with the following changes/special instructions:_________________________________________________________   ___ I have read the above report and request that my patient be discharged from therapy.      Physician Signature:        Date:       Time:

## 2018-04-11 ENCOUNTER — HOSPITAL ENCOUNTER (OUTPATIENT)
Dept: PHYSICAL THERAPY | Age: 43
Discharge: HOME | End: 2018-04-11
Payer: COMMERCIAL

## 2018-04-11 PROCEDURE — 97140 MANUAL THERAPY 1/> REGIONS: CPT

## 2018-04-11 PROCEDURE — 97110 THERAPEUTIC EXERCISES: CPT

## 2018-04-11 PROCEDURE — 97035 APP MDLTY 1+ULTRASOUND EA 15: CPT

## 2018-04-11 NOTE — PROGRESS NOTES
PT DAILY TREATMENT NOTE 8    Patient Name: Sindhu Linton  Date:2018  : 1975  [x]  Patient  Verified  Payor: Pam Tejada / Plan: Jessie De Jesus PPO / Product Type: PPO /    In time: 4:32 pm        Out time: 5:27 pm  Total Treatment Time (min): 55  Visit #: 1 of 3-7    Treatment Area: Chronic foot pain, left [M79.672, G89.29]    SUBJECTIVE  Pain Level (0-10 scale): 0  Any medication changes, allergies to medications, adverse drug reactions, diagnosis change, or new procedure performed?: [x] No    [] Yes (see summary sheet for update)  Subjective functional status/changes:   [] No changes reported  \"I have no pain today. I saw my doctor today and he said I should finish out my three weeks. \"    OBJECTIVE  Modality rationale: increase tissue healing, reduce inflammation, improve tissue extensibility to improve the patient's ability to return to running   Min Type Additional Details    [] Estim: []Att   []Unatt        []TENS instruct                  []IFC  []Premod   []NMES                     []Other:  []w/US   []w/ice   []w/heat  Position:  Location:   8 [x]  Ultrasound  [x]  3.3 mHz, 1.2 w/cm2 Position: prone  Location: (L) achilles tendon and periphery    []  Vasopneumatic Device Pressure:       [] lo [] med [] hi   Temperature: [] lo [] med [] hi   [x] Skin assessment post-treatment:  [x]intact []redness- no adverse reaction       [x]redness  adverse reaction:     37 min Therapeutic Exercise:  [x] See flow sheet: progressed running 2 minutes on TM at 7.3 mph, 3 min fast walking cool down; progressed gastroc/soleus stretching to stairs versus slant board to inc stretch on PF; add standing hip extension in forward flexed position to encourage glut training for running   Rationale: increase ROM, increase strength, improve coordination, improve balance and increase proprioception to improve the patients ability to > tolerance to prolonged amb    10 min Manual Therapy:  SI check - (R) post inn rotation corrected with MET and shot gun (cavitation noted) - recheck - symmetrical; posterior talus mobs f/b calf stretching   Rationale: decrease pain, increase ROM, increase tissue extensibility and decrease trigger points to improve the patient's ability to amb with a normalized gait pattern          X min Patient Education: [x] Review HEP     Other Objective/Functional Measures:   Encouraged chi running on TM today - no c/o pain at 7.3 mph for 2 minutes, but fatigued sec deconditioning. Pt did note ant ankle discomfort with soleus stretching on step abolished with talus mobs posteriorly. Pain Level (0-10 scale) post treatment: 0; global soreness    ASSESSMENT/Changes in Function:   Patient demos good, safe running dynamics with chi running on TM today without c/o pain. Notable SI pelvic obliquity corrected fully today with MET with cavitation noted; discussed avoidance of crossing legs with sitting to prevent recurrent rotations. Patient will continue to benefit from skilled PT services to modify and progress therapeutic interventions, address functional mobility deficits, address ROM deficits, address strength deficits, analyze and address soft tissue restrictions, analyze and cue movement patterns, analyze and modify body mechanics/ergonomics, assess and modify postural abnormalities and address imbalance/dizziness to attain remaining goals. []  See Plan of Care  [x]  See progress note/recertification (9/3/85)   []  See Discharge Summary         Progress towards goals / Updated goals:  1. Patient will be (I) with finalized HEP in preparation for D/C to self-management of sx. 2.  Patient will increase FOTO score to 81/100 for indications of increased functional mobility. 3.  Patient will demo GT extension with toe yoga x 10 reps without fatigue/cramping for improved intrinsic strength for return to running. 4. Patient will run 5 min on TM with no to min pain for progression to PLOF.  -Goal progressing; pt tolerated 2 mins of chi running on TM pain-free (4/11/18)    PLAN  [x]  Upgrade activities as tolerated     [x]  Continue plan of care  []  Update interventions per flow sheet       []  Discharge due to:_  [x]  Other: cont 1-2x/3 weeks; check SI again and correct if needed - instruct self-correct if needed; initiate GT PROM for ke Enriquez Brandon, PTA 4/11/2018

## 2018-04-16 ENCOUNTER — HOSPITAL ENCOUNTER (OUTPATIENT)
Dept: PHYSICAL THERAPY | Age: 43
Discharge: HOME | End: 2018-04-16
Payer: COMMERCIAL

## 2018-04-16 PROCEDURE — 97110 THERAPEUTIC EXERCISES: CPT

## 2018-04-16 PROCEDURE — 97140 MANUAL THERAPY 1/> REGIONS: CPT

## 2018-04-16 PROCEDURE — 97035 APP MDLTY 1+ULTRASOUND EA 15: CPT

## 2018-04-16 NOTE — PROGRESS NOTES
PT DAILY TREATMENT NOTE 8-    Patient Name: Jean Paul Glez  Date:2018  : 1975  [x]  Patient  Verified  Payor: Pramod Lucio / Plan: Reilly Fisher PPO / Product Type: PPO /    In time: 4:35 pm        Out time: 5:30 pm  Total Treatment Time (min): 55  Visit #: 2 of 3-7    Treatment Area: Chronic foot pain, left [G01.924, G89.29]    SUBJECTIVE  Pain Level (0-10 scale): 0  Any medication changes, allergies to medications, adverse drug reactions, diagnosis change, or new procedure performed?: [x] No    [] Yes (see summary sheet for update)  Subjective functional status/changes:   [] No changes reported  \"I can walk like a normal person again with normal shoes. I felt no pain after last time (running). \"    OBJECTIVE  Modality rationale: increase tissue healing, reduce inflammation, improve tissue extensibility to improve the patient's ability to return to running   Min Type Additional Details    [] Estim: []Att   []Unatt        []TENS instruct                  []IFC  []Premod   []NMES                     []Other:  []w/US   []w/ice   []w/heat  Position:  Location:   8 [x]  Ultrasound  [x]  3.3 mHz, 1.2 w/cm2 Position: prone  Location: (L) achilles tendon and periphery    []  Vasopneumatic Device Pressure:       [] lo [] med [] hi   Temperature: [] lo [] med [] hi   [x] Skin assessment post-treatment:  [x]intact []redness- no adverse reaction       [x]redness  adverse reaction:     39 min Therapeutic Exercise:  [x] See flow sheet:   Rationale: increase ROM, increase strength, improve coordination, improve balance and increase proprioception to improve the patients ability to > tolerance to prolonged amb    8 min Manual Therapy:  SI check - (R) post inn rotation corrected with MET and shot gun (cavitation noted) - recheck - symmetrical; posterior talus mobs f/b calf stretching   Rationale: decrease pain, increase ROM, increase tissue extensibility and decrease trigger points to improve the patient's ability to amb with a normalized gait pattern          X min Patient Education: [x] Review HEP     Other Objective/Functional Measures:       Pain Level (0-10 scale) post treatment: 0; global soreness    ASSESSMENT/Changes in Function:   Improved tolerance to running today with use of forward lean. Patient demos reduced strength with ankle eversion to maintain stability during RB balance. Patient will continue to benefit from skilled PT services to modify and progress therapeutic interventions, address functional mobility deficits, address ROM deficits, address strength deficits, analyze and address soft tissue restrictions, analyze and cue movement patterns, analyze and modify body mechanics/ergonomics, assess and modify postural abnormalities and address imbalance/dizziness to attain remaining goals. []  See Plan of Care  [x]  See progress note/recertification (5/5/73)   []  See Discharge Summary         Progress towards goals / Updated goals:  1. Patient will be (I) with finalized HEP in preparation for D/C to self-management of sx. 2.  Patient will increase FOTO score to 81/100 for indications of increased functional mobility. 3.  Patient will demo GT extension with toe yoga x 10 reps without fatigue/cramping for improved intrinsic strength for return to running. 4. Patient will run 5 min on TM with no to min pain for progression to PLOF.  -Goal progressing; pt tolerated 3 mins of chi running on TM pain-free (4/16/18)    PLAN  [x]  Upgrade activities as tolerated     [x]  Continue plan of care  []  Update interventions per flow sheet       []  Discharge due to:_  [x]  Other: cont 1-2x/3 weeks; check SI again and correct if needed - instruct self-correct if needed    Isaiah Tejada, PTA 4/16/2018

## 2018-04-18 ENCOUNTER — APPOINTMENT (OUTPATIENT)
Dept: PHYSICAL THERAPY | Age: 43
End: 2018-04-18
Payer: COMMERCIAL

## 2018-04-19 ENCOUNTER — HOSPITAL ENCOUNTER (OUTPATIENT)
Dept: PHYSICAL THERAPY | Age: 43
Discharge: HOME | End: 2018-04-19
Payer: COMMERCIAL

## 2018-04-19 PROCEDURE — 97140 MANUAL THERAPY 1/> REGIONS: CPT

## 2018-04-19 PROCEDURE — 97035 APP MDLTY 1+ULTRASOUND EA 15: CPT

## 2018-04-19 PROCEDURE — 97110 THERAPEUTIC EXERCISES: CPT

## 2018-04-19 NOTE — PROGRESS NOTES
PT DAILY TREATMENT NOTE 8-14    Patient Name: Tamara Hardy  Date:2018  : 1975  [x]  Patient  Verified  Payor: Chelsey Arboleda / Plan: Fay Ou PPO / Product Type: PPO /    In time:6:00  Out time:6:45  Total Treatment Time (min): 45  Total Timed Codes (min): 45  1:1 Treatment Time (min): 45   Visit #: 3 of 3-7    Treatment Area: Chronic foot pain, left [R33.511, G89.29]    SUBJECTIVE  Pain Level (0-10 scale): 1  Any medication changes, allergies to medications, adverse drug reactions, diagnosis change, or new procedure performed?: [x] No    [] Yes (see summary sheet for update)  Subjective functional status/changes:   [] No changes reported  Tried exercise class today and that irritated me. Deficits: jumping, hopping, stretching     Pt notes she is not a runner and is not motivated to do running for HEP. She would much rather do workout classes. OBJECTIVE  Modality rationale: decrease edema, decrease inflammation and decrease pain to improve the patients ability to improve running, gait   Min Type Additional Details   8 [x]  Ultrasound: []Continuous   [] Pulsed                           []1MHz   [x]3MHz 1.2 w/cm2 Location: L achilles tendon and periphery  W/cm2: 1.2,   In prone position     [] Skin assessment post-treatment:  []intact []redness- no adverse reaction       []redness  adverse reaction:       27 min Therapeutic Exercise:  [x] See flow sheet :   Rationale: increase ROM, increase strength, improve coordination, improve balance and increase proprioception to improve the patients ability to improve gait, running     10 min Manual Therapy:  SI check - (Neutral.  TCJ mobs to increase DF. DTm to medial gastroc in prone. Rationale: decrease pain, increase ROM, increase tissue extensibility and decrease trigger points to improve working out           x min Patient Education: [x] Review HEP    [] Progressed/Changed HEP based on:     10x jump down from stairs in home  3x30\" jumping in place. Start with 30\", progress up to 3x30\" with no c/o pain      [] positioning   [] body mechanics   [] transfers   [] heat/ice application        Other Objective/Functional Measures: Addition of jump downs from step in clinic. 10x, no pain. VC for avoidance of genu valgus. Pain Level (0-10 scale) post treatment: 0    ASSESSMENT/Changes in Function: Pt demo's good AROM of the L ankle. Min TrP thorough the medial aspect. Demo's improved control of the L ankle with balance. Good form for jump downs today with min VC and no c/o pain. Attempt to progress plyometrics for HEp and in therapy to return to full function with pain-free exercise classes. Patient will continue to benefit from skilled PT services to modify and progress therapeutic interventions, address functional mobility deficits, address ROM deficits, address strength deficits, analyze and address soft tissue restrictions, analyze and cue movement patterns, analyze and modify body mechanics/ergonomics, assess and modify postural abnormalities, address imbalance/dizziness and instruct in home and community integration to attain remaining goals. []  See Plan of Care  []  See progress note/recertification  []  See Discharge Summary         Progress towards goals / Updated goals:  1.  Patient will be (I) with finalized HEP in preparation for D/C to self-management of sx.   2.  Patient will increase FOTO score to 81/100 for indications of increased functional mobility. 3.  Patient will demo GT extension with toe yoga x 10 reps without fatigue/cramping for improved intrinsic strength for return to running.    4. Patient will run 5 min on TM with no to min pain for progression to PLOF. -Goal progressing; addition of plyometrics for [facility] today to progress to running and pain-free working out [employer]) 4/19/18  PLAN  []  Upgrade activities as tolerated     []  Continue plan of care  []  Update interventions per flow sheet       []  Discharge due to:_  [] Other:cont 1-2x/2-3 weeks; check SI again and correct if needed - instruct self-correct if needed_ assess response to addition of plyos for HEP to progress to functional gains with workout classes      Ruben Lacey, PT 4/19/2018  2:16 PM

## 2018-04-23 ENCOUNTER — HOSPITAL ENCOUNTER (OUTPATIENT)
Dept: PHYSICAL THERAPY | Age: 43
Discharge: HOME | End: 2018-04-23
Payer: COMMERCIAL

## 2018-04-23 PROCEDURE — 97110 THERAPEUTIC EXERCISES: CPT

## 2018-04-23 PROCEDURE — 97140 MANUAL THERAPY 1/> REGIONS: CPT

## 2018-04-23 PROCEDURE — 97035 APP MDLTY 1+ULTRASOUND EA 15: CPT

## 2018-04-23 NOTE — PROGRESS NOTES
PT DAILY TREATMENT NOTE 8-14    Patient Name: Yadira Gavin  Date:2018  : 1975  [x]  Patient  Verified  Payor: Jen Juarez / Plan: Chemichele Tsering PPO / Product Type: PPO /    In time: 4:29 pm        Out time: 5:20 pm   Total Treatment Time (min): 51  Visit #: 4 of 3-7    Treatment Area: Chronic foot pain, left [M79.672, G89.29]    SUBJECTIVE  Pain Level (0-10 scale): 1  Any medication changes, allergies to medications, adverse drug reactions, diagnosis change, or new procedure performed?: [x] No    [] Yes (see summary sheet for update)  Subjective functional status/changes:   [] No changes reported  \"I felt okay after last time. No pain with jump downs from my step at home and or walking, but I still haven't gone back to [place] since I tried last time. \"    OBJECTIVE  Modality rationale: decrease edema, decrease inflammation and decrease pain to improve the patients ability to improve running, gait   Min Type Additional Details   8 [x]  Ultrasound: []Continuous   [x] Pulsed                           []1MHz   [x]3MHz 1.2 w/cm2 Location: L achilles tendon and periphery  W/cm2: 1.2  In prone position     [x] Skin assessment post-treatment:  [x]intact []redness- no adverse reaction       []redness  adverse reaction:     33 min Therapeutic Exercise:  [x] See flow sheet: reinitiated plyos static hops, F/B, lateral with emphasis on GT flexion   Rationale: increase ROM, increase strength, improve coordination, improve balance and increase proprioception to improve the patients ability to improve gait, running     10 min Manual Therapy:  SI check - symmetrical; TCJ mobs to increase DF; DTM to medial gastroc in prone   Rationale: decrease pain, increase ROM, increase tissue extensibility and decrease trigger points to improve working out           X min Patient Education: [x] Review HEP - reinitiate hops     Other Objective/Functional Measures:     Pain Level (0-10 scale) post treatment: 2    ASSESSMENT/Changes in Function:   Patient cont's with good ankle AROM and stability during jump downs with no inc in pain. Mild inc in discomfort with reinitiation of static plyos. May consider return to hops as patient demos inc hip strategy with this. Patient will continue to benefit from skilled PT services to modify and progress therapeutic interventions, address functional mobility deficits, address ROM deficits, address strength deficits, analyze and address soft tissue restrictions, analyze and cue movement patterns, analyze and modify body mechanics/ergonomics, assess and modify postural abnormalities, address imbalance/dizziness and instruct in home and community integration to attain remaining goals. []  See Plan of Care  []  See progress note/recertification  []  See Discharge Summary         Progress towards goals / Updated goals:  1.  Patient will be (I) with finalized HEP in preparation for D/C to self-management of sx.   2.  Patient will increase FOTO score to 81/100 for indications of increased functional mobility. 3.  Patient will demo GT extension with toe yoga x 10 reps without fatigue/cramping for improved intrinsic strength for return to running.    4. Patient will run 5 min on TM with no to min pain for progression to PLOF. -Goal progressing; addition of plyometrics for [facility] today to progress to running and pain-free working out [employer]) 4/19/18    PLAN  [x]  Upgrade activities as tolerated     [x]  Continue plan of care  []  Update interventions per flow sheet       []  Discharge due to:_  [x]  Other: progress plyos as tolerated, possible torri jumps as patient demos inc hip strategy    Angel Reyes, PTA 4/23/2018

## 2018-04-25 ENCOUNTER — HOSPITAL ENCOUNTER (OUTPATIENT)
Dept: PHYSICAL THERAPY | Age: 43
Discharge: HOME | End: 2018-04-25
Payer: COMMERCIAL

## 2018-04-25 PROCEDURE — 97035 APP MDLTY 1+ULTRASOUND EA 15: CPT

## 2018-04-25 PROCEDURE — 97140 MANUAL THERAPY 1/> REGIONS: CPT

## 2018-04-25 PROCEDURE — 97110 THERAPEUTIC EXERCISES: CPT

## 2018-04-25 NOTE — PROGRESS NOTES
PT DAILY TREATMENT NOTE 8-14    Patient Name: Rai Simpson  Date:2018  : 1975  [x]  Patient  Verified  Payor: Lori Guillen / Plan: Hennie Goodpasture PPO / Product Type: PPO /    In time: 4:31 pm      Out time: 5:26 pm   Total Treatment Time (min): 55  Visit #: 5 of 3-7    Treatment Area: Chronic foot pain, left [M79.672, G89.29]    SUBJECTIVE  Pain Level (0-10 scale): 1  Any medication changes, allergies to medications, adverse drug reactions, diagnosis change, or new procedure performed?: [x] No    [] Yes (see summary sheet for update)  Subjective functional status/changes:   [] No changes reported  \"It's just sore, but it's constant today. \"    OBJECTIVE  Modality rationale: decrease edema, decrease inflammation and decrease pain to improve the patients ability to improve running, gait   Min Type Additional Details   8 [x]  Ultrasound: [x]Continuous   [] Pulsed                           [x]1MHz   []3MHz 1.2 w/cm2 Location: L achilles tendon and periphery, medial PF  W/cm2: 1.2  In prone position     [x] Skin assessment post-treatment:  [x]intact []redness- no adverse reaction       []redness  adverse reaction:     38 min Therapeutic Exercise:  [x] See flow sheet: held plyos, initiated torri jumps (2 laps, fwd>right>fwd>left>fed, etc)   Rationale: increase ROM, increase strength, improve coordination, improve balance and increase proprioception to improve the patients ability to improve gait, running     9 min Manual Therapy:  SI check - symmetrical; TCJ mobs to increase DF; DTM to medial gastroc in prone with PNF stretching, GT extension stretch   Rationale: decrease pain, increase ROM, increase tissue extensibility and decrease trigger points to improve working out           X min Patient Education: [x] Review HEP     Other Objective/Functional Measures:     Pain Level (0-10 scale) post treatment: 2 soreness    ASSESSMENT/Changes in Function:   Good control with plyo torri jumps, however, pt cont to req VCs to avoid valgus collapse. Ankle mobility WNL into DF. Patient chief strength deficit is glut and core weakness. Cont's with PF tightness medially addressed well with US. Will cont to assess response to plyo jumps as pt intent on returning to [place]. Patient will continue to benefit from skilled PT services to modify and progress therapeutic interventions, address functional mobility deficits, address ROM deficits, address strength deficits, analyze and address soft tissue restrictions, analyze and cue movement patterns, analyze and modify body mechanics/ergonomics, assess and modify postural abnormalities, address imbalance/dizziness and instruct in home and community integration to attain remaining goals. []  See Plan of Care  []  See progress note/recertification  []  See Discharge Summary         Progress towards goals / Updated goals:  1.  Patient will be (I) with finalized HEP in preparation for D/C to self-management of sx.   2.  Patient will increase FOTO score to 81/100 for indications of increased functional mobility. 3.  Patient will demo GT extension with toe yoga x 10 reps without fatigue/cramping for improved intrinsic strength for return to running.    4. Patient will run 5 min on TM with no to min pain for progression to PLOF. -Goal progressing; addition of plyometrics for [facility] today to progress to running and pain-free working out [employer]) 4/19/18    PLAN  [x]  Upgrade activities as tolerated     [x]  Continue plan of care  []  Update interventions per flow sheet       []  Discharge due to:_  []  Other:_    Heidi Tyson, PTA 4/25/2018

## 2018-04-30 ENCOUNTER — HOSPITAL ENCOUNTER (OUTPATIENT)
Dept: PHYSICAL THERAPY | Age: 43
Discharge: HOME | End: 2018-04-30
Payer: COMMERCIAL

## 2018-04-30 PROCEDURE — 97035 APP MDLTY 1+ULTRASOUND EA 15: CPT

## 2018-04-30 PROCEDURE — 97140 MANUAL THERAPY 1/> REGIONS: CPT

## 2018-04-30 PROCEDURE — 97110 THERAPEUTIC EXERCISES: CPT

## 2018-04-30 NOTE — PROGRESS NOTES
PT DAILY TREATMENT NOTE 8-14    Patient Name: Mamie Rae  Date:2018  : 1975  [x]  Patient  Verified  Payor: Chel Flanagan / Plan: Maggi Gonzalez PPO / Product Type: PPO /    In time: 4:33 pm      Out time: 5:23 pm  Total Treatment Time (min): 50  Visit #: 6 of 3-7    Treatment Area: Chronic foot pain, left [L55.020, G89.29]    SUBJECTIVE  Pain Level (0-10 scale): 0.5  Any medication changes, allergies to medications, adverse drug reactions, diagnosis change, or new procedure performed?: [x] No    [] Yes (see summary sheet for update)  Subjective functional status/changes:   [] No changes reported  \"Went to jazzercise class and it went better than before. I think I am getting better with low-impact stuff. \"    OBJECTIVE  Modality rationale: decrease edema, decrease inflammation and decrease pain to improve the patients ability to improve running, gait   Min Type Additional Details   8 [x]  Ultrasound: [x]Continuous   [] Pulsed                           [x]1MHz   []3MHz 1.2 w/cm2 Location: L achilles tendon and periphery, medial PF  W/cm2: 1.3  In prone position     [x] Skin assessment post-treatment:  [x]intact []redness- no adverse reaction       []redness  adverse reaction:     33 min Therapeutic Exercise:  [x] See flow sheet: added planks, and prone glut kick   Rationale: increase ROM, increase strength, improve coordination, improve balance and increase proprioception to improve the patients ability to improve gait, running     9 min Manual Therapy: prone STM/DTM to (L) medial>lateral GSC; GT PROM and CFM to PF   Rationale: decrease pain, increase ROM, increase tissue extensibility and decrease trigger points to improve working out           X min Patient Education: [x] Review HEP     Other Objective/Functional Measures:     Pain Level (0-10 scale) post treatment: 1    ASSESSMENT/Changes in Function:   Patient demos improved control with torri jumps.  Cont's with quick onset of glut fatigue with prone kicks, but quickly resolving. Discussed readiness for D/C at next session d/t improving sx with return to low-impact activity with pt agreeable. Patient will continue to benefit from skilled PT services to modify and progress therapeutic interventions, address functional mobility deficits, address ROM deficits, address strength deficits, analyze and address soft tissue restrictions, analyze and cue movement patterns, analyze and modify body mechanics/ergonomics, assess and modify postural abnormalities, address imbalance/dizziness and instruct in home and community integration to attain remaining goals. []  See Plan of Care  []  See progress note/recertification  []  See Discharge Summary         Progress towards goals / Updated goals:  1.  Patient will be (I) with finalized HEP in preparation for D/C to self-management of sx.   2.  Patient will increase FOTO score to 81/100 for indications of increased functional mobility.  -Goal progressing; pt notes return to jazzercise with min discomfort (4/30/18)  3.  Patient will demo GT extension with toe yoga x 10 reps without fatigue/cramping for improved intrinsic strength for return to running. -Goal progressing; pt notes toe yoga at home > 10 reps without pain (4/30/18)  4.  Patient will run 5 min on TM with no to min pain for progression to PLOF. -Goal progressing; addition of plyometrics for [facility] today to progress to running and pain-free working out [employer]) 4/19/18    PLAN  [x]  Upgrade activities as tolerated     [x]  Continue plan of care  []  Update interventions per flow sheet       []  Discharge due to:_  [x]  Other: assess goals NV for D/C; finalize HEP for D/C    Chilo Balderrama, PTA 4/30/2018

## 2018-05-02 ENCOUNTER — HOSPITAL ENCOUNTER (OUTPATIENT)
Dept: PHYSICAL THERAPY | Age: 43
Discharge: HOME | End: 2018-05-02
Payer: COMMERCIAL

## 2018-05-02 PROCEDURE — 97035 APP MDLTY 1+ULTRASOUND EA 15: CPT

## 2018-05-02 PROCEDURE — 97110 THERAPEUTIC EXERCISES: CPT

## 2018-05-02 PROCEDURE — 97140 MANUAL THERAPY 1/> REGIONS: CPT

## 2018-05-02 NOTE — PROGRESS NOTES
PT DAILY TREATMENT NOTE 8-14    Patient Name: Yudi Leida  Date:2018  : 1975  [x]  Patient  Verified  Payor: Janis Fangs / Plan: Willie Pearce PPO / Product Type: PPO /    In time: 4:29 pm      Out time: 5:28 pm  Total Treatment Time (min): 59  Visit #: 7 of 3-7    Treatment Area: Chronic foot pain, left [M79.672, G89.29]    SUBJECTIVE  Pain Level (0-10 scale): 1  Any medication changes, allergies to medications, adverse drug reactions, diagnosis change, or new procedure performed?: [x] No    [] Yes (see summary sheet for update)  Subjective functional status/changes:   [] No changes reported  \"I am much better than before. \"    OBJECTIVE  Modality rationale: decrease edema, decrease inflammation and decrease pain to improve the patients ability to improve running, gait   Min Type Additional Details   8 [x]  Ultrasound: [x]Continuous   [] Pulsed                           [x]1MHz   []3MHz 1.2 w/cm2 Location: L achilles tendon and periphery, medial PF  W/cm2: 1.3  In prone position     [x] Skin assessment post-treatment:  [x]intact []redness- no adverse reaction       []redness  adverse reaction:     42 min Therapeutic Exercise:  [x] See flow sheet: assisted pt with FOTO completion   Rationale: increase ROM, increase strength, improve coordination, improve balance and increase proprioception to improve the patients ability to improve gait, running     9 min Manual Therapy: Reassess; prone STM/DTM to (L) medial>lateral GSC; GT PROM and CFM to PF   Rationale: decrease pain, increase ROM, increase tissue extensibility and decrease trigger points to improve working out           X min Patient Education: [x] Review HEP     Other Objective/Functional Measures:   Improvements: prolonged amb, standing tolerance unlimited, return to jazzercise, symmetrical pelvic alignment, avg pain levels  FOTO score: 83/100 (at last assessment, 73/100)  Ankle/foot AROM: WNL in all directions  Ankle/foot strength: grossly 5/5  Knee strength: ext 5/5, flex 5/5  Hip strength: flex 5/5, abd 4/5, ext 4/5  Core strength: 100% bridge, 30 second plank  Pain: (B) 0, (W) 1-2    Pain Level (0-10 scale) post treatment: 0    ASSESSMENT/Changes in Function:   See D/C. Patient will continue to benefit from skilled PT services to modify and progress therapeutic interventions, address functional mobility deficits, address ROM deficits, address strength deficits, analyze and address soft tissue restrictions, analyze and cue movement patterns, analyze and modify body mechanics/ergonomics, assess and modify postural abnormalities, address imbalance/dizziness and instruct in home and community integration to attain remaining goals. [x]  See Discharge Summary         Progress towards goals / Updated goals:  1.  Patient will be (I) with finalized HEP in preparation for D/C to self-management of sx. -Goal met HEP finalized today with pt noting understanding  2.  Patient will increase FOTO score to 81/100 for indications of increased functional mobility.  -Goal met; 83/100  3.  Patient will demo GT extension with toe yoga x 10 reps without fatigue/cramping for improved intrinsic strength for return to running. -Goal met; pt able to perform x 2 sets without adverse reaction  4.  Patient will run 5 min on TM with no to min pain for progression to PLOF. -Goal met; pt notes return to  jazzercise class with min pain alleviated with stretching    PLAN   [x]  Discharge due to: patient meeting goals    Yaya Romo, PTA 5/2/2018

## 2018-05-02 NOTE — PROGRESS NOTES
[address] [facility] [address] [facility] AT [address]     Phone: [phone] Fax: [phone] SUMMARY  Patient Name: Marnie Moise : 1975   Treatment/Medical Diagnosis: Chronic foot pain, left [I99.269, G89.29]   Referral Source: Nay Soto MD     Date of Initial Visit: 3/21/18 Attended Visits: 13 Missed Visits: 0     SUMMARY OF TREATMENT  Pt is a [facility] [address] y. o. year old female who presents with co L heel/ foot pain starting insidiously 1.5 year ago. Treatment has consisted of the following: Therapeutic exercise, Therapeutic activities, Physical Agent/Modalities including Ultrasound Gait/balance training, Manual therapy, Patient education, Self Care training and Functional mobility training. CURRENT STATUS  Patient has made excellent progress in PT, reporting return to jazzercise classes with min discomfort. Assessment as follows:  FOTO score: 83/100 (at last assessment, 73/100)  Ankle/foot AROM: WNL in all directions  Ankle/foot strength: grossly 5/5  Knee strength: ext 5/5, flex 5/5  Hip strength: flex 5/5, abd 4/5, ext 4/5  Core strength: 100% bridge, 30 second plank  Pain: (B) 0, (W) 1-2    Goal/Measure of Progress:  1.  Patient will be (I) with finalized HEP in preparation for D/C to self-management of sx. -Goal met HEP finalized today with pt noting understanding  2.  Patient will increase FOTO score to 81/100 for indications of increased functional mobility.  -Goal met; 83/100  3.  Patient will demo GT extension with toe yoga x 10 reps without fatigue/cramping for improved intrinsic strength for return to running. -Goal met; pt able to perform x 2 sets without adverse reaction  4.  Patient will run 5 min on TM with no to min pain for progression to PLOF. -Goal met; pt notes return to  jazzercise class with min pain alleviated with stretching    Home exercise program established on initial evaluation, progressed as patient is able, and finalized at last PT treatrment to address deficits. Patient now has all of the tools to continue with progress per HEP. RECOMMENDATIONS  Discontinue therapy. Progressing towards or have reached established goals. If you have any questions/comments please contact us directly at [phone]. Thank you for allowing us to assist in the care of your patient.     LPTA Signature: Dani Rojas Date: 1/25/19   Therapist Signature: Xavi Berg DPVIKY  Time: 9:17 AM

## 2018-05-25 ENCOUNTER — DOCUMENTATION ONLY (OUTPATIENT)
Dept: INTERNAL MEDICINE CLINIC | Age: 43
End: 2018-05-25

## 2021-11-11 ENCOUNTER — HOSPITAL ENCOUNTER (OUTPATIENT)
Dept: PHYSICAL THERAPY | Age: 46
Discharge: HOME | End: 2021-11-11
Payer: COMMERCIAL

## 2021-11-11 PROCEDURE — 97110 THERAPEUTIC EXERCISES: CPT

## 2021-11-11 PROCEDURE — 97161 PT EVAL LOW COMPLEX 20 MIN: CPT

## 2021-11-11 PROCEDURE — 97112 NEUROMUSCULAR REEDUCATION: CPT

## 2021-11-11 PROCEDURE — 97535 SELF CARE MNGMENT TRAINING: CPT

## 2021-11-11 NOTE — PROGRESS NOTES
[facility]  [address]  [place], [address]  [phone] fax    Plan of Care/ Statement of Necessity for Physical Therapy Services    Patient name: Deandre Esparza Start of Care: 2021   Referral source: JIMY Wahl : 1975    Medical Diagnosis: Left arm pain [M79.602]  Payor: Tana Aguilarer / Plan: Con Lunger PPO / Product Type: PPO /  Onset Date:2021    Treatment Diagnosis: left shoulder pain   Prior Hospitalization: see medical history Provider#: [id number]   Medications: Verified on Patient summary List    Comorbidities: thyroid problems, polyglandular autoimmune syndrome II   Prior Level of Function: no previous shoulder pain endorsed for any ADLs or recreational tasks     The Plan of Care and following information is based on the information from the initial evaluation. Assessment/ key information: Pt is a 21-year-old right-hand dominant woman presenting to the clinic today with c/o left shoulder pain over the lateral brachium, tingling from this point down to the first two digits, and a \"divet\" in the upper arm with uncomfortable pull. Pt reports pain began in 2021 after rowing for some time. She works in a lab, and using the microscope (elbow flexion, IR/ER, and supination) increases pain. Also c/o decreased abduction ROM. ROM in all other directions is full. Pt is active, and reports swimming increases pain and she has had to play tennis with her right UE only. Today, special testing of the left UE is mostly unremarkable as she received an injection two days ago that has helped alleviate some of the pain, but she endorses that the pain is still there \"all the time. \" Cervical screening today is negative for symptom provocation, with slight increased tingling noted only with right c/s rotation, suggesting potential medial nerve entrapment at the scalenes. (-) compression and Spurling's tests.  Other impairments include reduced BUE strength, reduced postural awareness, minor soft tissue restrictions of the B pecs, UT, LS, and scalenes, and reduced ease of ADLs. Signs and symptoms are consistent with biceps strain based on pt report. Pt to benefit from skilled PT services to address the aforementioned impairments. Evaluation Complexity History LOW Complexity : Zero comorbidities / personal factors that will impact the outcome / POC; Examination MEDIUM Complexity : 3 Standardized tests and measures addressing body structure, function, activity limitation and / or participation in recreation  ;Presentation LOW Complexity : Stable, uncomplicated  ;Clinical Decision Making MEDIUM Complexity : FOTO score of 26-74  Overall Complexity Rating: LOW   Problem List: pain affecting function, decrease ROM, decrease strength, decrease ADL/ functional abilitiies, decrease activity tolerance and decrease flexibility/ joint mobility   Treatment Plan may include any combination of the following: Therapeutic exercise, Therapeutic activities, Neuromuscular re-education, Physical agent/modality, Manual therapy, Patient education, Self Care training and Functional mobility training  Patient / Family readiness to learn indicated by: asking questions, trying to perform skills and interest  Persons(s) to be included in education: patient (P)  Barriers to Learning/Limitations: None  Patient Goal (s): regain normal ability  Patient Self Reported Health Status: good  Rehabilitation Potential: good    Short Term Goals: To be accomplished in 2 weeks:  1. Pt will report compliance with HEP to maximize therapeutic success. Long Term Goals: To be accomplished in 4 weeks:  1. Pt will improve her FOTO to 75, demonstrating improved functional capacity for ADLs. 2. Pt will improve her left shoulder ABD to WNL to improve capacity for recreation. 3. Pt will improve B shoulder strength to 5/5 MMT to improve stability and ease of ADLs and exercise routines.    4. Pt will report resolution of tingling in her fingers or improvement of 75% or greater to improve QOL and comfort with work tasks. Frequency / Duration: Patient to be seen 1-2 times per week for 4 weeks. Patient/ Caregiver education and instruction: Diagnosis, prognosis, self care, activity modification and exercises   [x]  Plan of care has been reviewed with HITESH Mckoy, PT 11/11/2021 9:10 AM    ________________________________________________________________________    I certify that the above Therapy Services are being furnished while the patient is under my care. I agree with the treatment plan and certify that this therapy is necessary.     [de-identified] Signature:____________Date:_________TIME:________     JIMY Duarte  ** Signature, Date and Time must be completed for valid certification **    Please sign and return to [facility] [address]  [address]  [place], [address]  [phone] fax

## 2021-11-11 NOTE — PROGRESS NOTES
PT DAILY TREATMENT NOTE     Patient Name: Wilman Epps  Date:2021  : 1975  [x]  Patient  Verified  Payor: Romeo Lynn / Plan: Alfredo Soriano PPO / Product Type: PPO /    In time:744am  Out time:834am  Total Treatment Time (min): 50  Visit #: 1 of 4-8     Treatment Area: Left arm pain [M79.602]    SUBJECTIVE  Pain Level (0-10 scale): 3  Any medication changes, allergies to medications, adverse drug reactions, diagnosis change, or new procedure performed?: [x] No    [] Yes (see summary sheet for update)  Subjective functional status/changes:   [] No changes reported  See POC and paper evaluation form    OBJECTIVE    15 min [x]Eval                  []Re-Eval        15 min Therapeutic Exercise:  [] See flow sheet : HEP creation and education/instruction   Rationale: increase ROM, increase strength and improve coordination to improve the patients ability to manage ADLs with reduced pain. 12 min Self Care/Home Management:  []  See flow sheet : pt education regarding relevant anatomy and pathology as it relates to self care. Discussed limiting activities that increase pain and introducing pain-free strengthening to support the shoulder. Ice after activity. Rationale: increase ROM, increase strength and decrease pain  to improve the patients ability to manage self care. 8 min Neuromuscular Re-education:  []  See flow sheet : pt education regarding proper scapulohumeral and scapulothoracic rhythm, postural education, and the effects of an improperly working shoulder on pain and function. Discussed importance of training shoulder blade to assist with re-educating posturally derived symptoms. Rationale: increase strength and improve coordination  to improve the patients ability to self-manage posture and reduce pain.            With   [] TE   [] TA   [] neuro   [] other: Patient Education: [x] Review HEP    [] Progressed/Changed HEP based on:   [] positioning   [] body mechanics   [] transfers [] heat/ice application    [] other:      Other Objective/Functional Measures: see paper eval     Pain Level (0-10 scale) post treatment: 3    ASSESSMENT/Changes in Function: Pt is a 67-year-old right-hand dominant woman presenting to the clinic today with c/o left shoulder pain over the lateral brachium, tingling from this point down to the first two digits, and a \"divet\" in the upper arm with uncomfortable pull. Pt reports pain began in August 2021 after rowing for some time. She works in a lab, and using the microscope (elbow flexion, IR/ER, and supination) increases pain. Also c/o decreased abduction ROM. ROM in all other directions is full. Pt is active, and reports swimming increases pain and she has had to play tennis with her right UE only. Today, special testing of the left UE is mostly unremarkable as she received an injection two days ago that has helped alleviate some of the pain, but she endorses that the pain is still there \"all the time. \" Cervical screening today is negative for symptom provocation, with slight increased tingling noted only with right c/s rotation, suggesting potential medial nerve entrapment at the scalenes. (-) compression and Spurling's tests. Other impairments include reduced BUE strength, reduced postural awareness, minor soft tissue restrictions of the B pecs, UT, LS, and scalenes, and reduced ease of ADLs. Signs and symptoms are consistent with biceps strain based on pt report. Pt to benefit from skilled PT services to address the aforementioned impairments. Patient will continue to benefit from skilled PT services to modify and progress therapeutic interventions, address functional mobility deficits, address ROM deficits, address strength deficits, analyze and address soft tissue restrictions, analyze and cue movement patterns, analyze and modify body mechanics/ergonomics and assess and modify postural abnormalities to attain remaining goals.      [x]  See Plan of Care  [] See progress note/recertification  []  See Discharge Summary         Progress towards goals / Updated goals:    Short Term Goals: To be accomplished in 2 weeks:  1. Pt will report compliance with HEP to maximize therapeutic success. Eval: HEP assigned  Long Term Goals: To be accomplished in 4 weeks:  1. Pt will improve her FOTO to 75, demonstrating improved functional capacity for ADLs. Eval: 65  2. Pt will improve her left shoulder ABD to WNL to improve capacity for recreation. Eval: 130 deg abd, 140 deg scaption  3. Pt will improve B shoulder strength to 5/5 MMT to improve stability and ease of ADLs and exercise routines. Eval: grossly 4/5 right UE, 4-/5 left UE  4. Pt will report resolution of tingling in her fingers or improvement of 75% or greater to improve QOL and comfort with work tasks.    Eval: tingling from anterolateral brachium to first two digits of left hand    PLAN  []  Upgrade activities as tolerated     [x]  Continue plan of care  []  Update interventions per flow sheet       []  Discharge due to:_  []  Other:_      Letty Grullon, PT 11/11/2021  8:52 AM    Future Appointments   Date Time Provider Suzy Messina   11/19/2021  3:00 PM Carmine Elaine, PTA [facility]   12/1/2021  5:15 PM Heydi Sifuentes, PT [facility]   12/3/2021  5:15 PM Liss Larry, PT [facility]   12/8/2021  5:30 PM Zac Ortiz, PT [facility]   12/10/2021  5:15 PM Liss Larry, PT [facility]

## 2021-11-19 ENCOUNTER — HOSPITAL ENCOUNTER (OUTPATIENT)
Dept: PHYSICAL THERAPY | Age: 46
Discharge: HOME | End: 2021-11-19
Payer: COMMERCIAL

## 2021-11-19 PROCEDURE — 97140 MANUAL THERAPY 1/> REGIONS: CPT

## 2021-11-19 PROCEDURE — 97112 NEUROMUSCULAR REEDUCATION: CPT

## 2021-11-19 PROCEDURE — 97110 THERAPEUTIC EXERCISES: CPT

## 2021-11-19 NOTE — PROGRESS NOTES
PT DAILY TREATMENT NOTE     Patient Name: Hanh Olmstead  Date:2021  : 1975  [x]  Patient  Verified  Payor: Sheela Rivera / Plan: Paco Aguayo PPO / Product Type: PPO /    In time:3:00  Out time:3:47  Total Treatment Time (min): 52  Visit #: 1 of 4-8    Treatment Area: Left arm pain [M79.602]    SUBJECTIVE  Pain Level (0-10 scale): 0  Any medication changes, allergies to medications, adverse drug reactions, diagnosis change, or new procedure performed?: [x] No    [] Yes (see summary sheet for update)  Subjective functional status/changes:   [] No changes reported  Pt states she has not had a lot of pain in her left arm since getting an injection. OBJECTIVE      31 min Therapeutic Exercise:  [x] See flow sheet :   Rationale: increase ROM and increase strength to improve the patients ability to perform functional task with ease. 8 min Neuromuscular Re-education:  [x]  See flow sheet :   Rationale: increase strength, improve coordination and increase proprioception  to improve the patients ability to perform ADL's with ease. 8 min Manual Therapy:  STM to UT/LS/scalenes/rhomboids and scapular clocks in right S/L. Inf glides in supine. The manual therapy interventions were performed at a separate and distinct time from the therapeutic activities interventions. Rationale: decrease pain, increase ROM, increase tissue extensibility and decrease trigger points to improve functional mobility with overhead reaching ADL's. With   [] TE   [] TA   [] neuro   [] other: Patient Education: [x] Review HEP    [] Progressed/Changed HEP based on:   [] positioning   [] body mechanics   [] transfers   [] heat/ice application    [] other:      Other Objective/Functional Measures: initiated therex per flowsheet.      Pain Level (0-10 scale) post treatment: 0    ASSESSMENT/Changes in Function:   First f/u with pt displaying a good tolerance to therex and reporting no increasing pain with initiated exercises. Pt displays limited T/S mobility noted with prone exercises with pt requiring tactile cueing to improve. Minor pull in the inferior T/S noted with supine Y's however pt notes decreased discomfort with manual. Pt reports HEP understanding and compliance. No pain reported post treatment. Patient will continue to benefit from skilled PT services to modify and progress therapeutic interventions, address functional mobility deficits, address ROM deficits, address strength deficits, analyze and address soft tissue restrictions, analyze and cue movement patterns, analyze and modify body mechanics/ergonomics and assess and modify postural abnormalities to attain remaining goals. [x]  See Plan of Care  []  See progress note/recertification  []  See Discharge Summary         Progress towards goals / Updated goals:  Short Term Goals: To be accomplished in 2 weeks:  1. Pt will report compliance with HEP to maximize therapeutic success. Eval: HEP assigned  Current: Met 11/19/21 pt reports understanding and compliance. Long Term Goals: To be accomplished in 4 weeks:  1. Pt will improve her FOTO to 75, demonstrating improved functional capacity for ADLs. Eval: 65  2. Pt will improve her left shoulder ABD to WNL to improve capacity for recreation. Eval: 130 deg abd, 140 deg scaption  3. Pt will improve B shoulder strength to 5/5 MMT to improve stability and ease of ADLs and exercise routines. Eval: grossly 4/5 right UE, 4-/5 left UE  4. Pt will report resolution of tingling in her fingers or improvement of 75% or greater to improve QOL and comfort with work tasks.               Eval: tingling from anterolateral brachium to first two digits of left hand    PLAN  []  Upgrade activities as tolerated     [x]  Continue plan of care  []  Update interventions per flow sheet       []  Discharge due to:_  []  Other:_      Geno Mares, PTA 11/19/2021  3:11 PM    Future Appointments   Date Time Provider Suzy Messina   12/1/2021  5:15 PM Wilver Quezada [facility], PT [facility]   12/3/2021  5:15 PM Adrian Bobby, PT [facility]   12/8/2021  5:30 PM Donnamarie Osler, PT [facility]   12/10/2021  5:15 PM Adrian Bobby, PT [facility]

## 2021-12-01 ENCOUNTER — HOSPITAL ENCOUNTER (OUTPATIENT)
Dept: PHYSICAL THERAPY | Age: 46
Discharge: HOME | End: 2021-12-01
Payer: COMMERCIAL

## 2021-12-01 PROCEDURE — 97110 THERAPEUTIC EXERCISES: CPT

## 2021-12-01 PROCEDURE — 97112 NEUROMUSCULAR REEDUCATION: CPT

## 2021-12-01 PROCEDURE — 97140 MANUAL THERAPY 1/> REGIONS: CPT

## 2021-12-02 NOTE — PROGRESS NOTES
PT DAILY TREATMENT NOTE     Patient Name: Dany Quinones  Date:2021  : 1975  [x]  Patient  Verified  Payor: Genaro Simmons / Plan: Kunal Collier PPO / Product Type: PPO /    In time:515  Out time:600  Total Treatment Time (min): 39  Visit #: 3 of 4-8    Treatment Area: Left arm pain [M79.602]    SUBJECTIVE  Pain Level (0-10 scale): 0/10  Any medication changes, allergies to medications, adverse drug reactions, diagnosis change, or new procedure performed?: [x] No    [] Yes (see summary sheet for update)  Subjective functional status/changes:   [] No changes reported  \" Doing much better than before\"    OBJECTIVE    25 min Therapeutic Exercise:  [x] See flow sheet :   Rationale: increase ROM and increase strength to improve the patients ability to return to recreation and function with ease. 12 min Neuromuscular Re-education:  [x]  See flow sheet : Emphasis on scap setting. Rationale: increase strength and improve balance  to improve the patients ability to stabilize scapula for improved overhead ROM and lifting. 8 min Manual Therapy:  STM to prone to UT/Levator/Rhomboid, scapula mobilization, PA's Grade I-III to Tspine. [address] post/Inf glide Grade II/II in supine. The manual therapy interventions were performed at a separate and distinct time from the therapeutic activities interventions. Rationale: decrease pain, increase ROM and increase tissue extensibility to restore overhead mobility. With   [x] TE   [] TA   [x] neuro   [] other: Patient Education: [x] Review HEP    [] Progressed/Changed HEP based on:   [] positioning   [] body mechanics   [] transfers   [] heat/ice application    [] other:      Other Objective/Functional Measures: Overuse of Left uppertrap, + shrug with AROM Abduction, 3-/5 Low trap on Left.       Pain Level (0-10 scale) post treatment: 0/10    ASSESSMENT/Changes in Function: Patient progressing with decreased pain and increased function, Challenged with low trap engagement and proper scap/hum rhythm. Patient will continue to benefit from skilled PT services to modify and progress therapeutic interventions, address functional mobility deficits, address ROM deficits, address strength deficits, analyze and address soft tissue restrictions, analyze and cue movement patterns, analyze and modify body mechanics/ergonomics and assess and modify postural abnormalities to attain remaining goals. [x]  See Plan of Care  []  See progress note/recertification  []  See Discharge Summary         Progress towards goals / Updated goals:  Short Term Goals: To be accomplished in 2 weeks:  1. Pt will report compliance with HEP to maximize therapeutic success.              Eval: HEP assigned  Current: Met 11/19/21 pt reports understanding and compliance. Long Term Goals: To be accomplished in 4 weeks:  1. Pt will improve her FOTO to 75, demonstrating improved functional capacity for ADLs.             Eval: 65  2. Pt will improve her left shoulder ABD to WNL to improve capacity for recreation.              Eval: 130 deg abd, 140 deg scaption  3. Pt will improve B shoulder strength to 5/5 MMT to improve stability and ease of ADLs and exercise routines.               Eval: grossly 4/5 right UE, 4-/5 left UE  4.  Pt will report resolution of tingling in her fingers or improvement of 75% or greater to improve QOL and comfort with work tasks.  Teresa Mansfield: tingling from anterolateral brachium to first two digits of left hand   Current: Infrequent radicular sxs 12/1/21    PLAN  []  Upgrade activities as tolerated     [x]  Continue plan of care  []  Update interventions per flow sheet       []  Discharge due to:_  []  Other:_      Wicho Lebron, PT 12/1/2021  7:59 PM    Future Appointments   Date Time Provider Suzy Messina   12/3/2021  3:45 PM [facility]   12/8/2021  5:30 PM [facility]   12/10/2021  5:15 PM Rey Reveles, PT [facility]

## 2021-12-03 ENCOUNTER — HOSPITAL ENCOUNTER (OUTPATIENT)
Dept: PHYSICAL THERAPY | Age: 46
Discharge: HOME | End: 2021-12-03
Payer: COMMERCIAL

## 2021-12-03 PROCEDURE — 97140 MANUAL THERAPY 1/> REGIONS: CPT

## 2021-12-03 PROCEDURE — 97110 THERAPEUTIC EXERCISES: CPT

## 2021-12-03 PROCEDURE — 97112 NEUROMUSCULAR REEDUCATION: CPT

## 2021-12-03 NOTE — PROGRESS NOTES
PT DAILY TREATMENT NOTE     Patient Name: Atiya Brown  Date:12/3/2021  : 1975  [x]  Patient  Verified  Payor: Ruslan Del Valle / Plan: Hanane Platt PPO / Product Type: PPO /    In time:3:46  Out time:4:35  Total Treatment Time (min): 49  Visit #: 4 of 4-8    Treatment Area: Left arm pain [M79.602]    SUBJECTIVE  Pain Level (0-10 scale): 0  Any medication changes, allergies to medications, adverse drug reactions, diagnosis change, or new procedure performed?: [x] No    [] Yes (see summary sheet for update)  Subjective functional status/changes:   [] No changes reported  Pt states her shoulder is doing ok. OBJECTIVE    29 min Therapeutic Exercise:  [x] See flow sheet :   Rationale: increase ROM and increase strength to improve the patients ability to perform functional task with ease. 12 min Neuromuscular Re-education:  [x]  See flow sheet :   Rationale: increase strength, improve coordination and increase proprioception  to improve the patients ability to perform overhead reaching ADL's with ease. 8 min Manual Therapy:   STM to prone to UT/Levator/Rhomboid, scapula mobilization in right S/L,  GH post/Inf glide Grade II/II in supine. The manual therapy interventions were performed at a separate and distinct time from the therapeutic activities interventions. Rationale: decrease pain, increase ROM and increase tissue extensibility to improve overhead reaching ADL's. With   [] TE   [] TA   [] neuro   [] other: Patient Education: [x] Review HEP    [] Progressed/Changed HEP based on:   [] positioning   [] body mechanics   [] transfers   [] heat/ice application    [] other:      Other Objective/Functional Measures:      Pain Level (0-10 scale) post treatment: 0-1    ASSESSMENT/Changes in Function:   Continued challenge with left shoulder ABD noted with wall slides and prone T's with pt displaying a mild shoulder hike and reporting increased tightness however noting no significant pain.  Pt displays regressions in left shoulder ABD noted with testing however progressions with left shoulder scaption. Pt reports minor discomfort at the lateral shoulder post treatment but left in no apparent distress. Continued treatment to improve left shoulder strength and abd mobility to aid with ease of ADL's. Patient will continue to benefit from skilled PT services to modify and progress therapeutic interventions, address functional mobility deficits, address ROM deficits, address strength deficits, analyze and address soft tissue restrictions, analyze and cue movement patterns, analyze and modify body mechanics/ergonomics and assess and modify postural abnormalities to attain remaining goals. [x]  See Plan of Care  []  See progress note/recertification  []  See Discharge Summary         Progress towards goals / Updated goals:  Short Term Goals: To be accomplished in 2 weeks:  1. Pt will report compliance with HEP to maximize therapeutic success.              Eval: HEP assigned  Current: Met 11/19/21 pt reports understanding and compliance. Long Term Goals: To be accomplished in 4 weeks:  1. Pt will improve her FOTO to 75, demonstrating improved functional capacity for ADLs.             Eval: 65  2. Pt will improve her left shoulder ABD to WNL to improve capacity for recreation.              Eval: 130 deg abd, 140 deg scaption  Current: regressing 12/3/21 left shoulder  DEG,  Progressing SCAP 150 DEG  3. Pt will improve B shoulder strength to 5/5 MMT to improve stability and ease of ADLs and exercise routines.               Eval: grossly 4/5 right UE, 4-/5 left UE  4.  Pt will report resolution of tingling in her fingers or improvement of 75% or greater to improve QOL and comfort with work tasks.  Melissa Gottron: tingling from anterolateral brachium to first two digits of left hand              Current: Infrequent radicular sxs 12/1/21    PLAN  []  Upgrade activities as tolerated     [x]  Continue plan of care  []  Update interventions per flow sheet       []  Discharge due to:_  []  Other:_      Sada Molina, PTA 12/3/2021  4:00 PM    Future Appointments   Date Time Provider Suzy Messina   12/8/2021  5:30 PM [facility]   12/10/2021  5:15 PM Phyllis Velazquez, PT [facility]   12/13/2021  7:45 AM [facility] Bliss[facility]

## 2021-12-08 ENCOUNTER — HOSPITAL ENCOUNTER (OUTPATIENT)
Dept: PHYSICAL THERAPY | Age: 46
Discharge: HOME | End: 2021-12-08
Payer: COMMERCIAL

## 2021-12-08 PROCEDURE — 97140 MANUAL THERAPY 1/> REGIONS: CPT

## 2021-12-08 PROCEDURE — 97110 THERAPEUTIC EXERCISES: CPT

## 2021-12-08 PROCEDURE — 97112 NEUROMUSCULAR REEDUCATION: CPT

## 2021-12-08 NOTE — PROGRESS NOTES
PT DAILY TREATMENT NOTE     Patient Name: Le Lock  Date:2021  : 1975  [x]  Patient  Verified  Payor: Molina Myrickvert / Plan: Katie Garcia PPO / Product Type: PPO /    In time:5:28  Out time:6:10  Total Treatment Time (min): 42  Visit #: 5 of 8      Treatment Area: Left arm pain [M79.602]    SUBJECTIVE  Pain Level (0-10 scale): 0  Any medication changes, allergies to medications, adverse drug reactions, diagnosis change, or new procedure performed?: [x] No    [] Yes (see summary sheet for update)  Subjective functional status/changes:   [] No changes reported  The pt reports improvement of the left shoulder. OBJECTIVE        24 min Therapeutic Exercise:  [x] See flow sheet :   Rationale: increase ROM and increase strength to improve the patients ability to perform ADL       10 min Neuromuscular Re-education:  [x]  See flow sheet :   Rationale: increase strength, improve coordination and increase proprioception  to improve the patients ability to perform ADL    8 min Manual Therapy:  Pt prone- MFR/STM left UT, levator and rhomboids, t/s rib springs   The manual therapy interventions were performed at a separate and distinct time from the therapeutic activities interventions. Rationale: increase ROM and increase tissue extensibility to perform ADL       With   [] TE   [] TA   [] neuro   [] other: Patient Education: [x] Review HEP    [] Progressed/Changed HEP based on:   [] positioning   [] body mechanics   [] transfers   [] heat/ice application    [] other:      Other Objective/Functional Measures:  No changes     Pain Level (0-10 scale) post treatment: 2    ASSESSMENT/Changes in Function:  The pt has met LTG4. AROM of the left shoulder is limited in abduction but good PROM is noted in all planes. Some discomfort reported post treatment. The pt will benefit from gradual progression of strengthening to further her current progress. Pt was challenged with prone scapular exercises.     Patient will continue to benefit from skilled PT services to modify and progress therapeutic interventions, address functional mobility deficits, address ROM deficits, address strength deficits, analyze and address soft tissue restrictions, analyze and cue movement patterns, analyze and modify body mechanics/ergonomics and assess and modify postural abnormalities to attain remaining goals. []  See Plan of Care  []  See progress note/recertification  []  See Discharge Summary         Progress towards goals / Updated goals:  Short Term Goals: To be accomplished in 2 weeks:  1. Pt will report compliance with HEP to maximize therapeutic success.              Eval: HEP assigned  Current: Met 11/19/21 pt reports understanding and compliance. Long Term Goals: To be accomplished in 4 weeks:  1. Pt will improve her FOTO to 75, demonstrating improved functional capacity for ADLs.             Eval: 65  2. Pt will improve her left shoulder ABD to WNL to improve capacity for recreation.              Eval: 130 deg abd, 140 deg scaption  Current: abd 120 degrees and scaption 150 degrees on 12-8-21  3. Pt will improve B shoulder strength to 5/5 MMT to improve stability and ease of ADLs and exercise routines.               Eval: grossly 4/5 right UE, 4-/5 left UE  4.  Pt will report resolution of tingling in her fingers or improvement of 75% or greater to improve QOL and comfort with work tasks.  Jesu Smanuel Wiley: tingling from anterolateral brachium to first two digits of left hand              Current: MET 85% improved 12-8-21       PLAN  []  Upgrade activities as tolerated     [x]  Continue plan of care  []  Update interventions per flow sheet       []  Discharge due to:_  []  Other:_      Alexia Owen, PT 12/8/2021  5:44 PM    Future Appointments   Date Time Provider Suzy Messina   12/10/2021  5:15 PM [facility]   12/13/2021  7:45 AM Jero Washington, [facility]

## 2021-12-10 ENCOUNTER — HOSPITAL ENCOUNTER (OUTPATIENT)
Dept: PHYSICAL THERAPY | Age: 46
Discharge: HOME | End: 2021-12-10
Payer: COMMERCIAL

## 2021-12-10 PROCEDURE — 97112 NEUROMUSCULAR REEDUCATION: CPT

## 2021-12-10 PROCEDURE — 97110 THERAPEUTIC EXERCISES: CPT

## 2021-12-10 PROCEDURE — 97530 THERAPEUTIC ACTIVITIES: CPT

## 2021-12-10 PROCEDURE — 97140 MANUAL THERAPY 1/> REGIONS: CPT

## 2021-12-10 NOTE — PROGRESS NOTES
PT DAILY TREATMENT NOTE     Patient Name: Molina Andres  Date:12/10/2021  : 1975  [x]  Patient  Verified  Payor: Dutch Brandon / Plan: Chin Cabello PPO / Product Type: PPO /    In time:515pm  Out time:615pm  Total Treatment Time (min): 60  Visit #: 6 of 8     Treatment Area: Left arm pain [M79.602]    SUBJECTIVE  Pain Level (0-10 scale): 0  Any medication changes, allergies to medications, adverse drug reactions, diagnosis change, or new procedure performed?: [x] No    [] Yes (see summary sheet for update)  Subjective functional status/changes:   [] No changes reported  85% better. \"I feel almost normal again. \"    OBJECTIVE    32 min Therapeutic Exercise:  [x] See flow sheet :   Rationale: increase ROM, increase strength and improve coordination to improve the patients ability to manage ADLs with improved ease. 10 min Therapeutic Activity:  []  See flow sheet : functional reassessment. Pt education regarding recreational workouts and strategies to reduce pain. Rationale: increase strength and improve coordination  to improve the patients ability to manage ADLs and recreation with reduced pain. 10 min Neuromuscular Re-education:  [x]  See flow sheet :   Rationale: increase strength, improve coordination and increase proprioception  to improve the patients ability to manage functional activities with improved scapular stability. 8 min Manual Therapy:  Pt prone -- STM B levators, B UT, B c/s paraspinals, right scalenes   The manual therapy interventions were performed at a separate and distinct time from the therapeutic activities interventions. Rationale: decrease pain and increase tissue extensibility to reduce discomfort with self care tasks.           With   [] TE   [] TA   [] neuro   [] other: Patient Education: [x] Review HEP    [] Progressed/Changed HEP based on:   [] positioning   [] body mechanics   [] transfers   [] heat/ice application    [] other:      Other Objective/Functional Measures: see progress towards goals     Pain Level (0-10 scale) post treatment: 1, muscle soreness. ASSESSMENT/Changes in Function: Pt has met or is progressing towards meeting all functional goals. She expresses 85% improvement in symptoms. No pain at rest or with functional activities at this time. HEP has been updated and pt advised that she may resume upper body work out, ensuring she has no pain and does not overload. Emphasized importance of scapular strengthening to avoid recurrence of impingement pain. Advised to wait another month for swimming and then alternate stroke patterns. Pt has no further questions and is discharged. []  See Plan of Care  []  See progress note/recertification  [x]  See Discharge Summary         Progress towards goals / Updated goals:  Short Term Goals: To be accomplished in 2 weeks:  1. Pt will report compliance with HEP to maximize therapeutic success.              Eval: HEP assigned  Current: Met 11/19/21 pt reports understanding and compliance. Long Term Goals: To be accomplished in 4 weeks:  1. Pt will improve her FOTO to 75, demonstrating improved functional capacity for ADLs.             Eval: 65    Current: near met, 76 (12/10/2021)    2. Pt will improve her left shoulder ABD to WNL to improve capacity for recreation.              Eval: 130 deg abd, 140 deg scaption   Current: not met but progressed, abd 120 degrees and scaption 150 degrees on 12-8-21  3. Pt will improve B shoulder strength to 5/5 MMT to improve stability and ease of ADLs and exercise routines.               Eval: grossly 4/5 right UE, 4-/5 left UE    Current: met, 5/5 MMT B (12/10/2021)  4.  Pt will report resolution of tingling in her fingers or improvement of 75% or greater to improve QOL and comfort with work tasks.  Trudi Barros: tingling from anterolateral brachium to first two digits of left hand              Current: MET 85% improved 12-8-21       PLAN  []  Upgrade activities as tolerated [x]  Continue plan of care  []  Update interventions per flow sheet       []  Discharge due to:_  []  Other:_      Manny Chaves, PT 12/10/2021  5:19 PM    Future Appointments   Date Time Provider Suzy Messina   12/13/2021  7:45 AM Christina Marquez, PTA [facility]

## 2021-12-13 ENCOUNTER — APPOINTMENT (OUTPATIENT)
Dept: PHYSICAL THERAPY | Age: 46
End: 2021-12-13
Payer: COMMERCIAL

## 2021-12-16 NOTE — PROGRESS NOTES
[facility]  [address]  [place], [address]  [phone] fax    Physical Therapy Discharge Summary  Patient name: Andre Bansal Start of Care: 2021   Referral source: JIMY Sorensen : 1975   Medical/Treatment Diagnosis: Left arm pain [M79.602]  Payor: Abelino Cam / Plan: Alexandra Ruffin PPO / Product Type: PPO /  Onset Date:2021     Prior Hospitalization: see medical history Provider#: [id number]   Medications: Verified on Patient Summary List    Comorbidities: thyroid problems, polyglandular autoimmune syndrome II   Prior Level of Function: no previous shoulder pain endorsed for any ADLs or recreational tasks  Visits from Start of Care: 6    Missed Visits: 0  Reporting Period : 2021 to 12/10/2021      Summary of Care:  Short Term Goals: To be accomplished in 2 weeks:  1. Pt will report compliance with HEP to maximize therapeutic success.              Eval: HEP assigned  Current: Met, pt reports understanding and compliance. Long Term Goals: To be accomplished in 4 weeks:  1. Pt will improve her FOTO to 75, demonstrating improved functional capacity for ADLs.             Eval: 65               Current: near met, 77   2. Pt will improve her left shoulder ABD to WNL to improve capacity for recreation.              Eval: 130 deg abd, 140 deg scaption              Current: not met but progressed, abd 120 degrees and scaption 150 degrees   3. Pt will improve B shoulder strength to 5/5 MMT to improve stability and ease of ADLs and exercise routines.               Eval: grossly 4/5 right UE, 4-/5 left UE               Current: met, 5/5 MMT B   4.  Pt will report resolution of tingling in her fingers or improvement of 75% or greater to improve QOL and comfort with work tasks.  Brenna Zhao: tingling from anterolateral brachium to first two digits of left hand              Current: MET 85% improved       ASSESSMENT/RECOMMENDATIONS: Pt has met or is progressing towards meeting all functional goals. She expresses 85% improvement in symptoms. No pain at rest or with functional activities at this time. HEP has been updated and pt advised that she may resume upper body work out, ensuring she has no pain and does not overload. Emphasized importance of scapular strengthening to avoid recurrence of impingement pain. Advised to wait another month for swimming and then alternate stroke patterns. Pt has no further questions and is discharged.     [x]Discontinue therapy: [x]Patient has reached or is progressing toward set goals      []Patient is non-compliant or has abdicated      []Due to lack of appreciable progress towards set goals    Jacy Metcalf, PT 12/16/2021 3:31 PM

## 2022-03-18 PROBLEM — E31.0: Status: ACTIVE | Noted: 2017-08-29

## 2022-03-19 PROBLEM — E27.1 ADRENAL INSUFFICIENCY (ADDISON'S DISEASE) (HCC): Status: ACTIVE | Noted: 2017-08-29

## 2022-03-20 PROBLEM — E28.39 PREMATURE OVARIAN FAILURE: Status: ACTIVE | Noted: 2017-01-01

## 2024-02-26 ENCOUNTER — OFFICE VISIT (OUTPATIENT)
Age: 49
End: 2024-02-26
Payer: COMMERCIAL

## 2024-02-26 VITALS
HEIGHT: 67 IN | TEMPERATURE: 97.8 F | BODY MASS INDEX: 27.31 KG/M2 | DIASTOLIC BLOOD PRESSURE: 88 MMHG | HEART RATE: 84 BPM | WEIGHT: 174 LBS | OXYGEN SATURATION: 100 % | RESPIRATION RATE: 18 BRPM | SYSTOLIC BLOOD PRESSURE: 138 MMHG

## 2024-02-26 DIAGNOSIS — K64.2 GRADE III HEMORRHOIDS: Primary | ICD-10-CM

## 2024-02-26 PROCEDURE — 46600 DIAGNOSTIC ANOSCOPY SPX: CPT | Performed by: COLON & RECTAL SURGERY

## 2024-02-26 PROCEDURE — 99203 OFFICE O/P NEW LOW 30 MIN: CPT | Performed by: COLON & RECTAL SURGERY

## 2024-02-26 RX ORDER — HYDROCORTISONE 5 MG/1
5 TABLET ORAL DAILY
COMMUNITY

## 2024-02-26 RX ORDER — LEVOTHYROXINE SODIUM 0.12 MG/1
125 TABLET ORAL DAILY
COMMUNITY

## 2024-02-26 NOTE — PROGRESS NOTES
HPI: Avelina Chatterjee is a 48 y.o. female presenting with chief complain of hemorrhoids.  The prolapse and require manual reduction.  She has mucus discharge.  She has discomfort.  She moves her bowels every 3 days.  She has tried fiber supplements and high-fiber diet.  She denies fecal incontinence.  She has no family history colon cancer.  She had a recent colonoscopy with a 3-year recall due to polyps.    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    Family history of breast cancer     Family history of thyroid disease     Premature ovarian failure 2017    Primary hypothyroidism        Past Surgical History:   Procedure Laterality Date    GYN      c section       Family History   Problem Relation Age of Onset    Hypertension Father     Breast Cancer Mother 50       Social History     Socioeconomic History    Marital status:      Spouse name: None    Number of children: None    Years of education: None    Highest education level: None   Tobacco Use    Smoking status: Never    Smokeless tobacco: Never   Substance and Sexual Activity    Alcohol use: Yes     Comment: occasional    Drug use: No       Current Outpatient Medications   Medication Instructions    DHEA 25 MG TABS Oral    hydrocortisone (CORTEF) 5 mg, Oral, DAILY, Dosage varies according to symptoms for adrenal insuff    levothyroxine (SYNTHROID) 88 mcg, Oral, DAILY BEFORE BREAKFAST    levothyroxine (SYNTHROID) 125 mcg, Oral, DAILY    NONFORMULARY Iv hydrocortisone for emergency use    norethindrone-ethinyl estradiol (FEMHRT LOW DOSE) 0.5-2.5 MG-MCG per tablet 1 tablet, Oral, DAILY        No Known Allergies    ROS: negative    Vitals:    02/26/24 0837   BP: 138/88   Pulse: 84   Resp: 18   Temp: 97.8 °F (36.6 °C)   SpO2: 100%       Physical Exam  Rectum: 2 prolapsed hemorrhoids on initial inspection, on the small side  Digital exam:

## 2024-04-12 NOTE — PERIOP NOTE
Instructions for your surgery at [facility]      Today's Date:  4/12/2024      Patient's Name:  Avelina Chatterjee           Surgery Date:  5/31              Please enter the main entrance of the hospital and check-in at the  located in the lobby. Once checked in at the , you will take the elevators to the second floor, and report to the waiting room on the left. The room will say Procedure Registration.    Do NOT eat or drink anything, including candy, gum, or ice chips after midnight prior to your surgery, unless you have specific instructions from your surgeon or anesthesia provider to do so.  Brush your teeth before coming to the hospital. You may swish with water, but do not swallow.  No smoking/Vaping/E-Cigarettes 24 hours prior to the day of surgery.  No alcohol 24 hours prior to the day of surgery.  No recreational drugs for one week prior to the day of surgery.  Bring Photo ID, Insurance information, and Co-pay if required on day of surgery.  Bring in pertinent legal documents, such as, Medical Power of , DNR, Advance Directive, etc.  Leave all valuables, including money/purse, at home.  Remove all jewelry, including ALL body piercings, nail polish, acrylic nails, and makeup (including mascara); no lotions, powders, deodorant, or perfume/cologne/after shave on the skin.  Follow instruction for Hibiclens washes and CHG wipes from surgeon's office.   Glasses and dentures may be worn to the hospital. They must be removed prior to surgery. Please bring case/container for glasses or dentures.   Contact lenses should not be worn on day of surgery.   Call your doctor's office if symptoms of a cold or illness develop within 24-48 hours prior to your surgery.  Call your doctor's office if you have any questions concerning insurance or co-pays.  15. AN ADULT (relative or friend 18 years or older) MUST DRIVE YOU HOME AFTER YOUR SURGERY.  16. Please make arrangements for

## 2024-05-22 ENCOUNTER — TELEPHONE (OUTPATIENT)
Age: 49
End: 2024-05-22

## 2024-05-22 NOTE — TELEPHONE ENCOUNTER
Pt called and asked for the [facility] paperwork be emailed to her. I verified who she was and obtained the email address that she wanted it sent to .     Documents emailed to pt.

## 2024-05-24 ENCOUNTER — ANESTHESIA EVENT (OUTPATIENT)
Facility: HOSPITAL | Age: 49
End: 2024-05-24
Payer: COMMERCIAL

## 2024-05-28 ENCOUNTER — ANESTHESIA (OUTPATIENT)
Facility: HOSPITAL | Age: 49
End: 2024-05-28
Payer: COMMERCIAL

## 2024-05-28 ENCOUNTER — HOSPITAL ENCOUNTER (OUTPATIENT)
Facility: HOSPITAL | Age: 49
Setting detail: OUTPATIENT SURGERY
Discharge: HOME | End: 2024-05-28
Attending: COLON & RECTAL SURGERY | Admitting: COLON & RECTAL SURGERY
Payer: COMMERCIAL

## 2024-05-28 VITALS
RESPIRATION RATE: 12 BRPM | HEART RATE: 73 BPM | OXYGEN SATURATION: 98 % | TEMPERATURE: 98 F | SYSTOLIC BLOOD PRESSURE: 113 MMHG | HEIGHT: 67 IN | DIASTOLIC BLOOD PRESSURE: 60 MMHG | BODY MASS INDEX: 27.06 KG/M2 | WEIGHT: 172.4 LBS

## 2024-05-28 DIAGNOSIS — K64.2 GRADE III HEMORRHOIDS: Primary | ICD-10-CM

## 2024-05-28 LAB — HCG UR QL: NEGATIVE

## 2024-05-28 PROCEDURE — 7100000011 HC PHASE II RECOVERY - ADDTL 15 MIN: Performed by: COLON & RECTAL SURGERY

## 2024-05-28 PROCEDURE — 6360000002 HC RX W HCPCS: Performed by: NURSE ANESTHETIST, CERTIFIED REGISTERED

## 2024-05-28 PROCEDURE — 6370000000 HC RX 637 (ALT 250 FOR IP): Performed by: COLON & RECTAL SURGERY

## 2024-05-28 PROCEDURE — 88304 TISSUE EXAM BY PATHOLOGIST: CPT

## 2024-05-28 PROCEDURE — 3600000002 HC SURGERY LEVEL 2 BASE: Performed by: COLON & RECTAL SURGERY

## 2024-05-28 PROCEDURE — 2709999900 HC NON-CHARGEABLE SUPPLY: Performed by: COLON & RECTAL SURGERY

## 2024-05-28 PROCEDURE — 2580000003 HC RX 258: Performed by: NURSE ANESTHETIST, CERTIFIED REGISTERED

## 2024-05-28 PROCEDURE — 81025 URINE PREGNANCY TEST: CPT

## 2024-05-28 PROCEDURE — 2500000003 HC RX 250 WO HCPCS: Performed by: COLON & RECTAL SURGERY

## 2024-05-28 PROCEDURE — 3700000001 HC ADD 15 MINUTES (ANESTHESIA): Performed by: COLON & RECTAL SURGERY

## 2024-05-28 PROCEDURE — 7100000010 HC PHASE II RECOVERY - FIRST 15 MIN: Performed by: COLON & RECTAL SURGERY

## 2024-05-28 PROCEDURE — 3700000000 HC ANESTHESIA ATTENDED CARE: Performed by: COLON & RECTAL SURGERY

## 2024-05-28 PROCEDURE — 6370000000 HC RX 637 (ALT 250 FOR IP): Performed by: NURSE ANESTHETIST, CERTIFIED REGISTERED

## 2024-05-28 PROCEDURE — 3600000012 HC SURGERY LEVEL 2 ADDTL 15MIN: Performed by: COLON & RECTAL SURGERY

## 2024-05-28 PROCEDURE — 46260 REMOVE IN/EX HEM GROUPS 2+: CPT | Performed by: COLON & RECTAL SURGERY

## 2024-05-28 PROCEDURE — 7100000000 HC PACU RECOVERY - FIRST 15 MIN: Performed by: COLON & RECTAL SURGERY

## 2024-05-28 PROCEDURE — 6360000002 HC RX W HCPCS: Performed by: COLON & RECTAL SURGERY

## 2024-05-28 PROCEDURE — 7100000001 HC PACU RECOVERY - ADDTL 15 MIN: Performed by: COLON & RECTAL SURGERY

## 2024-05-28 PROCEDURE — 6360000002 HC RX W HCPCS: Performed by: ANESTHESIOLOGY

## 2024-05-28 RX ORDER — FENTANYL CITRATE 50 UG/ML
25 INJECTION, SOLUTION INTRAMUSCULAR; INTRAVENOUS EVERY 5 MIN PRN
Status: DISCONTINUED | OUTPATIENT
Start: 2024-05-28 | End: 2024-05-28 | Stop reason: HOSPADM

## 2024-05-28 RX ORDER — SODIUM CHLORIDE, SODIUM LACTATE, POTASSIUM CHLORIDE, CALCIUM CHLORIDE 600; 310; 30; 20 MG/100ML; MG/100ML; MG/100ML; MG/100ML
INJECTION, SOLUTION INTRAVENOUS CONTINUOUS
Status: DISCONTINUED | OUTPATIENT
Start: 2024-05-28 | End: 2024-05-28 | Stop reason: HOSPADM

## 2024-05-28 RX ORDER — PROPOFOL 10 MG/ML
INJECTION, EMULSION INTRAVENOUS PRN
Status: DISCONTINUED | OUTPATIENT
Start: 2024-05-28 | End: 2024-05-28 | Stop reason: SDUPTHER

## 2024-05-28 RX ORDER — TRAMADOL HYDROCHLORIDE 50 MG/1
100 TABLET ORAL PRN
Status: DISCONTINUED | OUTPATIENT
Start: 2024-05-28 | End: 2024-05-28 | Stop reason: HOSPADM

## 2024-05-28 RX ORDER — METOCLOPRAMIDE HYDROCHLORIDE 5 MG/ML
10 INJECTION INTRAMUSCULAR; INTRAVENOUS
Status: DISCONTINUED | OUTPATIENT
Start: 2024-05-28 | End: 2024-05-28 | Stop reason: HOSPADM

## 2024-05-28 RX ORDER — HYDROMORPHONE HYDROCHLORIDE 2 MG/ML
0.5 INJECTION, SOLUTION INTRAMUSCULAR; INTRAVENOUS; SUBCUTANEOUS EVERY 5 MIN PRN
Status: COMPLETED | OUTPATIENT
Start: 2024-05-28 | End: 2024-05-28

## 2024-05-28 RX ORDER — NORETHINDRONE ACETATE AND ETHINYL ESTRADIOL .005; 1 MG/1; MG/1
1 TABLET, FILM COATED ORAL DAILY
COMMUNITY
Start: 2024-04-01

## 2024-05-28 RX ORDER — ONDANSETRON 2 MG/ML
INJECTION INTRAMUSCULAR; INTRAVENOUS PRN
Status: DISCONTINUED | OUTPATIENT
Start: 2024-05-28 | End: 2024-05-28 | Stop reason: SDUPTHER

## 2024-05-28 RX ORDER — DEXAMETHASONE SODIUM PHOSPHATE 4 MG/ML
4 INJECTION, SOLUTION INTRA-ARTICULAR; INTRALESIONAL; INTRAMUSCULAR; INTRAVENOUS; SOFT TISSUE
Status: DISCONTINUED | OUTPATIENT
Start: 2024-05-28 | End: 2024-05-28 | Stop reason: HOSPADM

## 2024-05-28 RX ORDER — TRAMADOL HYDROCHLORIDE 50 MG/1
50 TABLET ORAL PRN
Status: DISCONTINUED | OUTPATIENT
Start: 2024-05-28 | End: 2024-05-28 | Stop reason: HOSPADM

## 2024-05-28 RX ORDER — OXYCODONE HYDROCHLORIDE AND ACETAMINOPHEN 5; 325 MG/1; MG/1
1 TABLET ORAL EVERY 4 HOURS PRN
Qty: 40 TABLET | Refills: 0 | Status: SHIPPED | OUTPATIENT
Start: 2024-05-28 | End: 2024-05-28 | Stop reason: HOSPADM

## 2024-05-28 RX ORDER — ACETAMINOPHEN 325 MG/1
650 TABLET ORAL
Status: DISCONTINUED | OUTPATIENT
Start: 2024-05-28 | End: 2024-05-28 | Stop reason: HOSPADM

## 2024-05-28 RX ORDER — FENTANYL CITRATE 50 UG/ML
INJECTION, SOLUTION INTRAMUSCULAR; INTRAVENOUS PRN
Status: DISCONTINUED | OUTPATIENT
Start: 2024-05-28 | End: 2024-05-28 | Stop reason: SDUPTHER

## 2024-05-28 RX ORDER — HYDROMORPHONE HYDROCHLORIDE 2 MG/ML
0.5 INJECTION, SOLUTION INTRAMUSCULAR; INTRAVENOUS; SUBCUTANEOUS EVERY 4 HOURS PRN
Status: DISCONTINUED | OUTPATIENT
Start: 2024-05-28 | End: 2024-05-28

## 2024-05-28 RX ORDER — FAMOTIDINE 20 MG/1
20 TABLET, FILM COATED ORAL ONCE
Status: COMPLETED | OUTPATIENT
Start: 2024-05-28 | End: 2024-05-28

## 2024-05-28 RX ORDER — MEPERIDINE HYDROCHLORIDE 25 MG/ML
12.5 INJECTION INTRAMUSCULAR; INTRAVENOUS; SUBCUTANEOUS EVERY 5 MIN PRN
Status: DISCONTINUED | OUTPATIENT
Start: 2024-05-28 | End: 2024-05-28 | Stop reason: HOSPADM

## 2024-05-28 RX ORDER — NALOXONE HYDROCHLORIDE 0.4 MG/ML
INJECTION, SOLUTION INTRAMUSCULAR; INTRAVENOUS; SUBCUTANEOUS PRN
Status: DISCONTINUED | OUTPATIENT
Start: 2024-05-28 | End: 2024-05-28 | Stop reason: HOSPADM

## 2024-05-28 RX ORDER — OXYCODONE HYDROCHLORIDE AND ACETAMINOPHEN 5; 325 MG/1; MG/1
1 TABLET ORAL
Status: COMPLETED | OUTPATIENT
Start: 2024-05-28 | End: 2024-05-28

## 2024-05-28 RX ORDER — LIDOCAINE HYDROCHLORIDE 10 MG/ML
1 INJECTION, SOLUTION EPIDURAL; INFILTRATION; INTRACAUDAL; PERINEURAL
Status: DISCONTINUED | OUTPATIENT
Start: 2024-05-28 | End: 2024-05-28 | Stop reason: HOSPADM

## 2024-05-28 RX ORDER — BACITRACIN ZINC 500 [USP'U]/G
OINTMENT TOPICAL PRN
Status: DISCONTINUED | OUTPATIENT
Start: 2024-05-28 | End: 2024-05-28 | Stop reason: ALTCHOICE

## 2024-05-28 RX ORDER — MIDAZOLAM HYDROCHLORIDE 1 MG/ML
INJECTION INTRAMUSCULAR; INTRAVENOUS PRN
Status: DISCONTINUED | OUTPATIENT
Start: 2024-05-28 | End: 2024-05-28 | Stop reason: SDUPTHER

## 2024-05-28 RX ORDER — OXYCODONE HYDROCHLORIDE AND ACETAMINOPHEN 5; 325 MG/1; MG/1
1 TABLET ORAL EVERY 4 HOURS PRN
Qty: 40 TABLET | Refills: 0 | Status: SHIPPED | OUTPATIENT
Start: 2024-05-28 | End: 2024-06-04

## 2024-05-28 RX ADMIN — HYDROMORPHONE HYDROCHLORIDE 0.5 MG: 1 INJECTION, SOLUTION INTRAMUSCULAR; INTRAVENOUS; SUBCUTANEOUS at 08:33

## 2024-05-28 RX ADMIN — FAMOTIDINE 20 MG: 20 TABLET ORAL at 06:14

## 2024-05-28 RX ADMIN — HYDROMORPHONE HYDROCHLORIDE 0.5 MG: 1 INJECTION, SOLUTION INTRAMUSCULAR; INTRAVENOUS; SUBCUTANEOUS at 08:28

## 2024-05-28 RX ADMIN — PROPOFOL 100 MCG/KG/MIN: 10 INJECTION, EMULSION INTRAVENOUS at 07:37

## 2024-05-28 RX ADMIN — PROPOFOL 50 MG: 10 INJECTION, EMULSION INTRAVENOUS at 07:33

## 2024-05-28 RX ADMIN — HYDROMORPHONE HYDROCHLORIDE 0.5 MG: 2 INJECTION, SOLUTION INTRAMUSCULAR; INTRAVENOUS; SUBCUTANEOUS at 08:45

## 2024-05-28 RX ADMIN — SODIUM CHLORIDE, POTASSIUM CHLORIDE, SODIUM LACTATE AND CALCIUM CHLORIDE: 600; 310; 30; 20 INJECTION, SOLUTION INTRAVENOUS at 06:14

## 2024-05-28 RX ADMIN — FENTANYL CITRATE 25 MCG: 50 INJECTION INTRAMUSCULAR; INTRAVENOUS at 07:57

## 2024-05-28 RX ADMIN — FENTANYL CITRATE 25 MCG: 50 INJECTION INTRAMUSCULAR; INTRAVENOUS at 09:00

## 2024-05-28 RX ADMIN — FENTANYL CITRATE 25 MCG: 50 INJECTION INTRAMUSCULAR; INTRAVENOUS at 09:05

## 2024-05-28 RX ADMIN — OXYCODONE AND ACETAMINOPHEN 1 TABLET: 5; 325 TABLET ORAL at 10:04

## 2024-05-28 RX ADMIN — MIDAZOLAM 2 MG: 1 INJECTION, SOLUTION INTRAMUSCULAR; INTRAVENOUS at 07:25

## 2024-05-28 RX ADMIN — FENTANYL CITRATE 25 MCG: 50 INJECTION INTRAMUSCULAR; INTRAVENOUS at 07:50

## 2024-05-28 RX ADMIN — HYDROMORPHONE HYDROCHLORIDE 0.5 MG: 2 INJECTION, SOLUTION INTRAMUSCULAR; INTRAVENOUS; SUBCUTANEOUS at 08:53

## 2024-05-28 RX ADMIN — FENTANYL CITRATE 50 MCG: 50 INJECTION INTRAMUSCULAR; INTRAVENOUS at 07:32

## 2024-05-28 RX ADMIN — ONDANSETRON 4 MG: 2 INJECTION INTRAMUSCULAR; INTRAVENOUS at 07:54

## 2024-05-28 ASSESSMENT — PAIN SCALES - GENERAL
PAINLEVEL_OUTOF10: 9
PAINLEVEL_OUTOF10: 10
PAINLEVEL_OUTOF10: 6
PAINLEVEL_OUTOF10: 7
PAINLEVEL_OUTOF10: 10
PAINLEVEL_OUTOF10: 10
PAINLEVEL_OUTOF10: 6

## 2024-05-28 ASSESSMENT — PAIN DESCRIPTION - PAIN TYPE
TYPE: SURGICAL PAIN

## 2024-05-28 ASSESSMENT — PAIN DESCRIPTION - LOCATION
LOCATION: RECTUM

## 2024-05-28 ASSESSMENT — PAIN DESCRIPTION - DESCRIPTORS
DESCRIPTORS: SORE;THROBBING
DESCRIPTORS: THROBBING;SORE
DESCRIPTORS: SORE;THROBBING
DESCRIPTORS: THROBBING;SORE
DESCRIPTORS: PRESSURE

## 2024-05-28 ASSESSMENT — PAIN - FUNCTIONAL ASSESSMENT
PAIN_FUNCTIONAL_ASSESSMENT: 0-10
PAIN_FUNCTIONAL_ASSESSMENT: ACTIVITIES ARE NOT PREVENTED

## 2024-05-28 NOTE — ANESTHESIA POSTPROCEDURE EVALUATION
Department of Anesthesiology  Postprocedure Note    Patient: Avelina Chatterjee  MRN: [id number]  YOB: 1975  Date of evaluation: 5/28/2024    Procedure Summary       Date: 05/28/24 Room / Location: [facility] MAIN 04 / [facility] MAIN OR    Anesthesia Start: 0725 Anesthesia Stop: 0821    Procedure: HEMORRHOIDECTOMY; PRONE (Rectum) Diagnosis:       Grade III hemorrhoids      (Grade III hemorrhoids [K64.2])    Surgeons: Ger Guevara MD Responsible Provider: Giselle Stovall MD    Anesthesia Type: MAC ASA Status: 2            Anesthesia Type: MAC    Timothy Phase I: Timothy Score: 10    Timotyh Phase II:      Anesthesia Post Evaluation    Patient location during evaluation: bedside  Patient participation: complete - patient participated  Airway patency: patent  Cardiovascular status: hemodynamically stable  Respiratory status: acceptable  Hydration status: stable    No notable events documented.

## 2024-05-28 NOTE — OP NOTE
Operative Note      Patient: Avelina Chatterjee  YOB: 1975  MRN: [id number]    Date of Procedure: 5/28/2024    Pre-Op Diagnosis Codes:     * Grade III hemorrhoids [K64.2]    Post-Op Diagnosis: Same       Procedure(s):  HEMORRHOIDECTOMY; PRONE    Surgeon(s):  Ger Guevara MD    Assistant:   Surgical Assistant: Nilesh Schmitt    Anesthesia: Monitor Anesthesia Care    Estimated Blood Loss (mL): less than 50     Complications: None    Specimens:   ID Type Source Tests Collected by Time Destination   A : right posterior hemorrhoid Tissue Anus SURGICAL PATHOLOGY Ger Guevara MD 5/28/2024 0743    B : right anterior hemorrhoid Tissue Anus SURGICAL PATHOLOGY Ger Guevara MD 5/28/2024 0753    C : left lateral hemorrhoid Tissue Anus SURGICAL PATHOLOGY Ger Guevara MD 5/28/2024 0757        Implants:  * No implants in log *      Drains: * No LDAs found *    Findings:  Infection Present At Time Of Surgery (PATOS) (choose all levels that have infection present):  No infection present  Other Findings: hemorrhoids    Detailed Description of Procedure:     The patient was properly identified in the holding area brought to the operating room.  She was placed in the prone jackknife position.  Sedation was administered by anesthesia.  Buttocks were taped apart and perianal area is prepped and draped in usual sterile fashion.  40 mL local anesthetic was injected.  Digital rectal exam and anoscopy were performed.  She had standard 3 quadrant hemorrhoidal disease.  All 3 quadrants were excised down to their pedicle and suture-ligated with 2-0 Vicryl suture.  Hemostasis was assured with additional 3-0 Vicryl suture and cautery.  The rectum was irrigated.  Gelfoam packing and dry dressings were applied.    The patient tolerated the procedure well.  All instrument sponge and needle counts were correct at the end of the case x 2.  The patient awoke from anesthesia and was transported the PACU in stable

## 2024-05-28 NOTE — H&P
HPI: Avelina Chatterjee is a 49 y.o. 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    Family history of breast cancer     Family history of thyroid disease     Premature ovarian failure 2017    Primary hypothyroidism        Past Surgical History:   Procedure Laterality Date    GYN      c section       Family History   Problem Relation Age of Onset    Hypertension Father     Breast Cancer Mother 50       Social History     Socioeconomic History    Marital status:      Spouse name: None    Number of children: None    Years of education: None    Highest education level: None   Tobacco Use    Smoking status: Never    Smokeless tobacco: Never   Substance and Sexual Activity    Alcohol use: Yes     Comment: occasional    Drug use: No       Current Outpatient Medications   Medication Instructions    DHEA 25 MG TABS Oral    FYAVOLV 1-5 MG-MCG TABS per tablet 1 tablet, Oral, DAILY    hydrocortisone (CORTEF) 5 mg, Oral, DAILY, Dosage varies according to symptoms for adrenal insuff<BR><BR>Patients states she took 50 mg per md for 05/28/24    levothyroxine (SYNTHROID) 88 mcg, Oral, DAILY BEFORE BREAKFAST    levothyroxine (SYNTHROID) 125 mcg, Oral, DAILY    NONFORMULARY Iv hydrocortisone for emergency use    norethindrone-ethinyl estradiol (FEMHRT LOW DOSE) 0.5-2.5 MG-MCG per tablet 1 tablet, Oral, DAILY    vitamin D (CHOLECALCIFEROL) 80544 UNIT CAPS TAKE ONE CAPSULE BY MOUTH WEEKLY        Allergies   Allergen Reactions    Mobile Hives       ROS: negative    Vitals:    05/28/24 0618   BP: 121/87   Pulse: 63   Resp: 20   Temp: 98.3 °F (36.8 °C)   SpO2: 98%       Physical Exam  Constitutional:       Appearance: She is well-developed.   HENT:      Head: Normocephalic and atraumatic.   Abdominal:      General: There is no distension.      Palpations: Abdomen

## 2024-05-28 NOTE — PERIOP NOTE
Patient /Family /Designee has been informed that [facility] is not responsible for patient belongings per policy and the signed [facility] Patient Agreement document.  Personal items should be sent home or checked in with security.  Patient /Family /Designee selected the following action:                            [x]  Send personal items home with a family member or friend                                                 []  Check in personal items with security, excluding clothing                            []  Maintain personal items at the bedside, against recommendation                                 by [facility]                                   ** If patient /family /designee chooses to maintain personal items at the bedside,                                      Complete the patient belongings inventory in the EMR.

## 2024-05-28 NOTE — DISCHARGE INSTRUCTIONS
Instructions After Hemorrhoidectomy    A packing was placed. It will fall out on its own and that is OK.  Apply dry dressings on your bottom as necessary.  Please take 4 dulcolax tablets tonight  Please take 2 tablespoons of mineral oil daily for 5 days starting tomorrow  Then STOP mineral oil and start metamucil daily  Sitz baths for comfort  Percocet for pain  Avoid Aspirin or ibuprofen (Advil, Aleve, Motrin), Tylenol is OK  Please call the office if you have not had a bowel movement in 3 days.         DISCHARGE SUMMARY from Nurse    PATIENT INSTRUCTIONS:    After general anesthesia or intravenous sedation, for 24 hours or while taking prescription Narcotics:  Limit your activities  Do not drive and operate hazardous machinery  Do not make important personal or business decisions  Do  not drink alcoholic beverages  If you have not urinated within 8 hours after discharge, please contact your surgeon on call.    Report the following to your surgeon:  Excessive pain, swelling, redness or odor of or around the surgical area  Temperature over 100.5  Nausea and vomiting lasting longer than 4 hours or if unable to take medications  Any signs of decreased circulation or nerve impairment to extremity: change in color, persistent  numbness, tingling, coldness or increase pain  Any questions      These are general instructions for a healthy lifestyle:    No smoking/ No tobacco products/ Avoid exposure to second hand smoke  Surgeon General's Warning:  Quitting smoking now greatly reduces serious risk to your health.    Obesity, smoking, and sedentary lifestyle greatly increases your risk for illness    A healthy diet, regular physical exercise & weight monitoring are important for maintaining a healthy lifestyle    You may be retaining fluid if you have a history of heart failure or if you experience any of the following symptoms:  Weight gain of 3 pounds or more overnight or 5 pounds in a week, increased swelling in our

## 2024-05-28 NOTE — ANESTHESIA PRE PROCEDURE
Department of Anesthesiology  Preprocedure Note       Name:  Avelina Chatterjee   Age:  49 y.o.  :  1975                                          MRN:  [id number]         Date:  2024      Surgeon: Surgeon(s):  Ger Guevara MD    Procedure: Procedure(s):  HEMORRHOIDECTOMY; PRONE    Medications prior to admission:   Prior to Admission medications    Medication Sig Start Date End Date Taking? Authorizing Provider   FYAVOLV 1-5 MG-MCG TABS per tablet Take 1 tablet by mouth daily 24  Yes ProviderVinod MD   vitamin D (CHOLECALCIFEROL) 66687 UNIT CAPS TAKE ONE CAPSULE BY MOUTH WEEKLY    ProviderVinod MD   levothyroxine (SYNTHROID) 125 MCG tablet Take 1 tablet by mouth Daily    Vinod Roche MD   hydrocortisone (CORTEF) 5 MG tablet Take 1 tablet by mouth daily Dosage varies according to symptoms for adrenal insuff    Patients states she took 50 mg per md for 24    Vinod Roche MD   NONFORMULARY Iv hydrocortisone for emergency use    ProviderVinod MD   levothyroxine (SYNTHROID) 88 MCG tablet Take 88 mcg by mouth every morning (before breakfast) 17   Automatic Reconciliation, Ar   norethindrone-ethinyl estradiol (FEMHRT LOW DOSE) 0.5-2.5 MG-MCG per tablet Take 1 tablet by mouth daily    Automatic Reconciliation, Ar   DHEA 25 MG TABS Take by mouth    Automatic Reconciliation, Ar       Current medications:    Current Facility-Administered Medications   Medication Dose Route Frequency Provider Last Rate Last Admin   • lidocaine PF 1 % injection 1 mL  1 mL IntraDERmal Once PRN Brein, Marcelina, APRN - CRNA       • lactated ringers IV soln infusion   IntraVENous Continuous Brein, Marcelina, APRN - CRNA 50 mL/hr at 24 0614 New Bag at 24 0614       Allergies:    Allergies   Allergen Reactions   • Strawberry Hives       Problem List:    Patient Active Problem List   Diagnosis Code   • 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

## 2024-05-29 ENCOUNTER — TELEPHONE (OUTPATIENT)
Age: 49
End: 2024-05-29

## 2024-05-29 NOTE — TELEPHONE ENCOUNTER
Call received asking for documentation that the pt had surgery on 05/28/24. Op report faxed to [phone] per request. Confirmation received job no [id number]  05/29/24 15:40 PM

## 2024-06-24 ENCOUNTER — OFFICE VISIT (OUTPATIENT)
Age: 49
End: 2024-06-24

## 2024-06-24 VITALS
OXYGEN SATURATION: 100 % | TEMPERATURE: 97.7 F | BODY MASS INDEX: 27.62 KG/M2 | HEART RATE: 81 BPM | RESPIRATION RATE: 16 BRPM | HEIGHT: 67 IN | SYSTOLIC BLOOD PRESSURE: 132 MMHG | DIASTOLIC BLOOD PRESSURE: 84 MMHG | WEIGHT: 176 LBS

## 2024-06-24 DIAGNOSIS — K64.2 GRADE III HEMORRHOIDS: Primary | ICD-10-CM

## 2024-06-24 PROCEDURE — 99024 POSTOP FOLLOW-UP VISIT: CPT | Performed by: COLON & RECTAL SURGERY

## 2024-06-24 NOTE — PROGRESS NOTES
Subjective: Recovered well.  And pain initially postoperatively but this is resolved.  Moving her bowels.  Stable Metamucil.    Past medical history and ROS were reviewed and unchanged.     Rectum: No visible open wounds  Digital exam with good tone and no stenosis    Assessment / Plan    Status post hemorrhoidectomy  High-fiber diet  Follow-up as needed    The diagnoses and plan were discussed with patient.  All questions answered.  Plan of care agreed to by all concerned.

## 2024-07-18 ENCOUNTER — TELEPHONE (OUTPATIENT)
Age: 49
End: 2024-07-18

## 2024-07-18 NOTE — TELEPHONE ENCOUNTER
Spoke to patient about bowel issues. She explains she is straining when she has a bowel movement and it is difficult to get out. She was having episodes of diarrhea and is concerned about anal stenosis post hemorrhoidectomy. She is taking Metamucil or Benefiber daily. Suggested she take Miralax along with the Metamucil to make things easier for her to evacuate without straining as much. Asked her to call back in a few days to let us know how she is, if needed we can bring her in for a rectal exam to ensure there is no stenosis.

## 2024-07-18 NOTE — TELEPHONE ENCOUNTER
Pt c/o pain and hardness x1 week, still taking fiber recently went on cruise and had a lot of diarrhea from virus, asking for call back to discuss

## (undated) DEVICE — PACK PROCEDURE SURG MAJ W/ BASIN LF

## (undated) DEVICE — SOLUTION IRRIG 1000ML 0.9% SOD CHL USP POUR PLAS BTL

## (undated) DEVICE — GAUZE,SPONGE,4"X4",16PLY,STRL,LF,10/TRAY: Brand: MEDLINE

## (undated) DEVICE — PAD,ABDOMINAL,8"X10",ST,LF: Brand: MEDLINE

## (undated) DEVICE — SUTURE CHROMIC GUT SZ 3-0 L27IN ABSRB BRN L26MM SH 1/2 CIR G122H

## (undated) DEVICE — INTENDED FOR TISSUE SEPARATION, AND OTHER PROCEDURES THAT REQUIRE A SHARP SURGICAL BLADE TO PUNCTURE OR CUT.: Brand: BARD-PARKER ® STAINLESS STEEL BLADES

## (undated) DEVICE — ELECTRODE PT RET AD L9FT HI MOIST COND ADH HYDRGEL CORDED

## (undated) DEVICE — TAPE,CLOTH/SILK,CURAD,3"X10YD,LF,40/CS: Brand: CURAD

## (undated) DEVICE — 3M™ MEDIPORE™ H SOFT CLOTH SURGICAL TAPE 2862, 2 INCH X 10 YARD (5CM X 9,1M), 12 ROLLS/CASE: Brand: 3M™ MEDIPORE™

## (undated) DEVICE — KIT OR TURNOVER

## (undated) DEVICE — SYRINGE MED 10ML TRNSLUC BRL PLUNG BLK MRK POLYPR CTRL

## (undated) DEVICE — SUTURE VICRYL + SZ 3-0 L27IN ABSRB UD L26MM SH 1/2 CIR VCP416H

## (undated) DEVICE — SOLUTION SCRB 4OZ 10% PVP I POVIDONE IOD TOP PAINT EXIDINE

## (undated) DEVICE — SUTURE VICRYL SZ 2-0 L27IN ABSRB UD L26MM SH 1/2 CIR J417H

## (undated) DEVICE — STERILE POLYISOPRENE POWDER-FREE SURGICAL GLOVES: Brand: PROTEXIS

## (undated) DEVICE — SHEET, T, LAPAROTOMY, STERILE: Brand: MEDLINE

## (undated) DEVICE — POSITIONER HD REST FOAM CMFRT TCH